# Patient Record
Sex: FEMALE | Race: WHITE | Employment: OTHER | URBAN - METROPOLITAN AREA
[De-identification: names, ages, dates, MRNs, and addresses within clinical notes are randomized per-mention and may not be internally consistent; named-entity substitution may affect disease eponyms.]

---

## 2017-03-08 PROBLEM — E78.00 PURE HYPERCHOLESTEROLEMIA: Status: ACTIVE | Noted: 2017-03-08

## 2017-03-08 PROBLEM — G40.909 SEIZURE DISORDER (HCC): Status: ACTIVE | Noted: 2017-03-08

## 2017-03-08 PROBLEM — Z23 NEED FOR SHINGLES VACCINE: Status: ACTIVE | Noted: 2017-03-08

## 2017-03-08 PROBLEM — M54.50 ACUTE BILATERAL LOW BACK PAIN WITHOUT SCIATICA: Status: ACTIVE | Noted: 2017-03-08

## 2018-04-09 ENCOUNTER — APPOINTMENT (OUTPATIENT)
Dept: CT IMAGING | Age: 73
End: 2018-04-09
Payer: COMMERCIAL

## 2018-04-09 ENCOUNTER — APPOINTMENT (OUTPATIENT)
Dept: GENERAL RADIOLOGY | Age: 73
End: 2018-04-09
Payer: COMMERCIAL

## 2018-04-09 ENCOUNTER — HOSPITAL ENCOUNTER (OUTPATIENT)
Age: 73
Setting detail: OBSERVATION
Discharge: HOME | End: 2018-04-11
Attending: EMERGENCY MEDICINE | Admitting: INTERNAL MEDICINE
Payer: COMMERCIAL

## 2018-04-09 DIAGNOSIS — R07.9 CHEST PAIN, UNSPECIFIED TYPE: Primary | ICD-10-CM

## 2018-04-09 LAB
ALBUMIN SERPL-MCNC: 3.5 G/DL (ref 3.5–5.2)
ALP BLD-CCNC: 108 U/L (ref 35–104)
ALT SERPL-CCNC: 11 U/L (ref 0–32)
ANION GAP SERPL CALCULATED.3IONS-SCNC: 12 MMOL/L (ref 7–16)
AST SERPL-CCNC: 12 U/L (ref 0–31)
BILIRUB SERPL-MCNC: 0.2 MG/DL (ref 0–1.2)
BUN BLDV-MCNC: 24 MG/DL (ref 8–23)
CALCIUM SERPL-MCNC: 8.7 MG/DL (ref 8.6–10.2)
CHLORIDE BLD-SCNC: 101 MMOL/L (ref 98–107)
CO2: 28 MMOL/L (ref 22–29)
CREAT SERPL-MCNC: 0.9 MG/DL (ref 0.5–1)
EKG ATRIAL RATE: 357 BPM
EKG Q-T INTERVAL: 404 MS
EKG QRS DURATION: 92 MS
EKG QTC CALCULATION (BAZETT): 423 MS
EKG R AXIS: -25 DEGREES
EKG T AXIS: -42 DEGREES
EKG VENTRICULAR RATE: 66 BPM
GFR AFRICAN AMERICAN: >60
GFR NON-AFRICAN AMERICAN: >60 ML/MIN/1.73
GLUCOSE BLD-MCNC: 130 MG/DL (ref 74–109)
HCT VFR BLD CALC: 31.8 % (ref 34–48)
HEMOGLOBIN: 9.5 G/DL (ref 11.5–15.5)
LACTIC ACID: 1.3 MMOL/L (ref 0.5–2.2)
LIPASE: 22 U/L (ref 13–60)
MCH RBC QN AUTO: 23 PG (ref 26–35)
MCHC RBC AUTO-ENTMCNC: 29.9 % (ref 32–34.5)
MCV RBC AUTO: 77 FL (ref 80–99.9)
PDW BLD-RTO: 16 FL (ref 11.5–15)
PLATELET # BLD: 220 E9/L (ref 130–450)
PMV BLD AUTO: 11.4 FL (ref 7–12)
POTASSIUM SERPL-SCNC: 4 MMOL/L (ref 3.5–5)
RBC # BLD: 4.13 E12/L (ref 3.5–5.5)
SODIUM BLD-SCNC: 141 MMOL/L (ref 132–146)
TOTAL PROTEIN: 5.9 G/DL (ref 6.4–8.3)
TROPONIN: <0.01 NG/ML (ref 0–0.03)
WBC # BLD: 7.6 E9/L (ref 4.5–11.5)

## 2018-04-09 PROCEDURE — 6370000000 HC RX 637 (ALT 250 FOR IP)

## 2018-04-09 PROCEDURE — 71275 CT ANGIOGRAPHY CHEST: CPT

## 2018-04-09 PROCEDURE — 96374 THER/PROPH/DIAG INJ IV PUSH: CPT

## 2018-04-09 PROCEDURE — 83605 ASSAY OF LACTIC ACID: CPT

## 2018-04-09 PROCEDURE — 71045 X-RAY EXAM CHEST 1 VIEW: CPT

## 2018-04-09 PROCEDURE — 96375 TX/PRO/DX INJ NEW DRUG ADDON: CPT

## 2018-04-09 PROCEDURE — 6360000004 HC RX CONTRAST MEDICATION: Performed by: RADIOLOGY

## 2018-04-09 PROCEDURE — 85027 COMPLETE CBC AUTOMATED: CPT

## 2018-04-09 PROCEDURE — 6360000002 HC RX W HCPCS: Performed by: EMERGENCY MEDICINE

## 2018-04-09 PROCEDURE — 6360000002 HC RX W HCPCS

## 2018-04-09 PROCEDURE — 84484 ASSAY OF TROPONIN QUANT: CPT

## 2018-04-09 PROCEDURE — 93005 ELECTROCARDIOGRAM TRACING: CPT | Performed by: EMERGENCY MEDICINE

## 2018-04-09 PROCEDURE — 36415 COLL VENOUS BLD VENIPUNCTURE: CPT

## 2018-04-09 PROCEDURE — 83690 ASSAY OF LIPASE: CPT

## 2018-04-09 PROCEDURE — 99285 EMERGENCY DEPT VISIT HI MDM: CPT

## 2018-04-09 PROCEDURE — 74174 CTA ABD&PLVS W/CONTRAST: CPT

## 2018-04-09 PROCEDURE — 2580000003 HC RX 258: Performed by: EMERGENCY MEDICINE

## 2018-04-09 PROCEDURE — 80053 COMPREHEN METABOLIC PANEL: CPT

## 2018-04-09 RX ORDER — NITROGLYCERIN 0.4 MG/1
0.4 TABLET SUBLINGUAL EVERY 5 MIN PRN
Status: DISCONTINUED | OUTPATIENT
Start: 2018-04-09 | End: 2018-04-11 | Stop reason: HOSPADM

## 2018-04-09 RX ORDER — FENTANYL CITRATE 50 UG/ML
100 INJECTION, SOLUTION INTRAMUSCULAR; INTRAVENOUS ONCE
Status: COMPLETED | OUTPATIENT
Start: 2018-04-09 | End: 2018-04-09

## 2018-04-09 RX ORDER — DIPHENHYDRAMINE HYDROCHLORIDE 50 MG/ML
25 INJECTION INTRAMUSCULAR; INTRAVENOUS ONCE
Status: COMPLETED | OUTPATIENT
Start: 2018-04-09 | End: 2018-04-09

## 2018-04-09 RX ORDER — 0.9 % SODIUM CHLORIDE 0.9 %
500 INTRAVENOUS SOLUTION INTRAVENOUS ONCE
Status: COMPLETED | OUTPATIENT
Start: 2018-04-09 | End: 2018-04-10

## 2018-04-09 RX ORDER — DIPHENHYDRAMINE HYDROCHLORIDE 50 MG/ML
INJECTION INTRAMUSCULAR; INTRAVENOUS
Status: COMPLETED
Start: 2018-04-09 | End: 2018-04-09

## 2018-04-09 RX ORDER — NITROGLYCERIN 0.4 MG/1
TABLET SUBLINGUAL
Status: COMPLETED
Start: 2018-04-09 | End: 2018-04-09

## 2018-04-09 RX ADMIN — DIPHENHYDRAMINE HYDROCHLORIDE 25 MG: 50 INJECTION INTRAMUSCULAR; INTRAVENOUS at 23:08

## 2018-04-09 RX ADMIN — IOPAMIDOL 90 ML: 755 INJECTION, SOLUTION INTRAVENOUS at 22:52

## 2018-04-09 RX ADMIN — SODIUM CHLORIDE 500 ML: 9 INJECTION, SOLUTION INTRAVENOUS at 21:41

## 2018-04-09 RX ADMIN — NITROGLYCERIN 0.4 MG: 0.4 TABLET SUBLINGUAL at 21:59

## 2018-04-09 RX ADMIN — FENTANYL CITRATE 100 MCG: 50 INJECTION, SOLUTION INTRAMUSCULAR; INTRAVENOUS at 22:52

## 2018-04-09 RX ADMIN — DIPHENHYDRAMINE HYDROCHLORIDE 25 MG: 50 INJECTION, SOLUTION INTRAMUSCULAR; INTRAVENOUS at 23:08

## 2018-04-09 ASSESSMENT — ENCOUNTER SYMPTOMS
ABDOMINAL PAIN: 0
NAUSEA: 1
CHEST TIGHTNESS: 1
VOMITING: 1

## 2018-04-09 ASSESSMENT — PAIN SCALES - GENERAL: PAINLEVEL_OUTOF10: 10

## 2018-04-10 PROBLEM — R07.89 ATYPICAL CHEST PAIN: Status: ACTIVE | Noted: 2018-04-10

## 2018-04-10 PROBLEM — R07.9 CHEST PAIN: Status: ACTIVE | Noted: 2018-04-10

## 2018-04-10 LAB
BASOPHILS ABSOLUTE: 0.02 E9/L (ref 0–0.2)
BASOPHILS RELATIVE PERCENT: 0.2 % (ref 0–2)
EOSINOPHILS ABSOLUTE: 0.02 E9/L (ref 0.05–0.5)
EOSINOPHILS RELATIVE PERCENT: 0.2 % (ref 0–6)
HCT VFR BLD CALC: 30.1 % (ref 34–48)
HEMOGLOBIN: 9 G/DL (ref 11.5–15.5)
IMMATURE GRANULOCYTES #: 0.03 E9/L
IMMATURE GRANULOCYTES %: 0.3 % (ref 0–5)
LV EF: 74 %
LVEF MODALITY: NORMAL
LYMPHOCYTES ABSOLUTE: 1.16 E9/L (ref 1.5–4)
LYMPHOCYTES RELATIVE PERCENT: 13.3 % (ref 20–42)
MCH RBC QN AUTO: 22.7 PG (ref 26–35)
MCHC RBC AUTO-ENTMCNC: 29.9 % (ref 32–34.5)
MCV RBC AUTO: 75.8 FL (ref 80–99.9)
MONOCYTES ABSOLUTE: 0.8 E9/L (ref 0.1–0.95)
MONOCYTES RELATIVE PERCENT: 9.2 % (ref 2–12)
NEUTROPHILS ABSOLUTE: 6.71 E9/L (ref 1.8–7.3)
NEUTROPHILS RELATIVE PERCENT: 76.8 % (ref 43–80)
PDW BLD-RTO: 16 FL (ref 11.5–15)
PLATELET # BLD: 191 E9/L (ref 130–450)
PMV BLD AUTO: 11.2 FL (ref 7–12)
RBC # BLD: 3.97 E12/L (ref 3.5–5.5)
TROPONIN: <0.01 NG/ML (ref 0–0.03)
TROPONIN: <0.01 NG/ML (ref 0–0.03)
WBC # BLD: 8.7 E9/L (ref 4.5–11.5)

## 2018-04-10 PROCEDURE — G0378 HOSPITAL OBSERVATION PER HR: HCPCS

## 2018-04-10 PROCEDURE — 6370000000 HC RX 637 (ALT 250 FOR IP): Performed by: EMERGENCY MEDICINE

## 2018-04-10 PROCEDURE — A9500 TC99M SESTAMIBI: HCPCS | Performed by: RADIOLOGY

## 2018-04-10 PROCEDURE — 78452 HT MUSCLE IMAGE SPECT MULT: CPT

## 2018-04-10 PROCEDURE — 6360000002 HC RX W HCPCS: Performed by: NURSE PRACTITIONER

## 2018-04-10 PROCEDURE — 93018 CV STRESS TEST I&R ONLY: CPT | Performed by: INTERNAL MEDICINE

## 2018-04-10 PROCEDURE — 93016 CV STRESS TEST SUPVJ ONLY: CPT | Performed by: INTERNAL MEDICINE

## 2018-04-10 PROCEDURE — 6370000000 HC RX 637 (ALT 250 FOR IP): Performed by: INTERNAL MEDICINE

## 2018-04-10 PROCEDURE — 36415 COLL VENOUS BLD VENIPUNCTURE: CPT

## 2018-04-10 PROCEDURE — 93017 CV STRESS TEST TRACING ONLY: CPT

## 2018-04-10 PROCEDURE — APPSS45 APP SPLIT SHARED TIME 31-45 MINUTES: Performed by: NURSE PRACTITIONER

## 2018-04-10 PROCEDURE — 2580000003 HC RX 258: Performed by: INTERNAL MEDICINE

## 2018-04-10 PROCEDURE — 3430000000 HC RX DIAGNOSTIC RADIOPHARMACEUTICAL: Performed by: RADIOLOGY

## 2018-04-10 PROCEDURE — 99205 OFFICE O/P NEW HI 60 MIN: CPT | Performed by: INTERNAL MEDICINE

## 2018-04-10 PROCEDURE — 84484 ASSAY OF TROPONIN QUANT: CPT

## 2018-04-10 PROCEDURE — 85025 COMPLETE CBC W/AUTO DIFF WBC: CPT

## 2018-04-10 RX ORDER — ACETAMINOPHEN 325 MG/1
650 TABLET ORAL EVERY 4 HOURS PRN
Status: DISCONTINUED | OUTPATIENT
Start: 2018-04-10 | End: 2018-04-11 | Stop reason: HOSPADM

## 2018-04-10 RX ORDER — PRAVASTATIN SODIUM 20 MG
40 TABLET ORAL DAILY
Status: DISCONTINUED | OUTPATIENT
Start: 2018-04-10 | End: 2018-04-11 | Stop reason: HOSPADM

## 2018-04-10 RX ORDER — ONDANSETRON 2 MG/ML
4 INJECTION INTRAMUSCULAR; INTRAVENOUS EVERY 6 HOURS PRN
Status: DISCONTINUED | OUTPATIENT
Start: 2018-04-10 | End: 2018-04-11 | Stop reason: HOSPADM

## 2018-04-10 RX ORDER — TRAMADOL HYDROCHLORIDE 50 MG/1
50 TABLET ORAL EVERY 4 HOURS PRN
Status: DISCONTINUED | OUTPATIENT
Start: 2018-04-10 | End: 2018-04-11 | Stop reason: HOSPADM

## 2018-04-10 RX ORDER — METOCLOPRAMIDE 5 MG/1
5 TABLET ORAL
Status: DISCONTINUED | OUTPATIENT
Start: 2018-04-10 | End: 2018-04-11 | Stop reason: HOSPADM

## 2018-04-10 RX ORDER — SODIUM CHLORIDE 0.9 % (FLUSH) 0.9 %
10 SYRINGE (ML) INJECTION PRN
Status: DISCONTINUED | OUTPATIENT
Start: 2018-04-10 | End: 2018-04-11 | Stop reason: HOSPADM

## 2018-04-10 RX ORDER — ACETAMINOPHEN AND CODEINE PHOSPHATE 300; 30 MG/1; MG/1
1 TABLET ORAL EVERY 6 HOURS PRN
Status: DISCONTINUED | OUTPATIENT
Start: 2018-04-10 | End: 2018-04-10

## 2018-04-10 RX ORDER — BUPROPION HYDROCHLORIDE 150 MG/1
150 TABLET ORAL EVERY MORNING
Status: DISCONTINUED | OUTPATIENT
Start: 2018-04-10 | End: 2018-04-11 | Stop reason: HOSPADM

## 2018-04-10 RX ORDER — ASPIRIN 81 MG/1
81 TABLET ORAL EVERY OTHER DAY
Status: DISCONTINUED | OUTPATIENT
Start: 2018-04-10 | End: 2018-04-11 | Stop reason: HOSPADM

## 2018-04-10 RX ORDER — LOSARTAN POTASSIUM AND HYDROCHLOROTHIAZIDE 25; 100 MG/1; MG/1
1 TABLET ORAL DAILY
Status: DISCONTINUED | OUTPATIENT
Start: 2018-04-10 | End: 2018-04-10 | Stop reason: SDUPTHER

## 2018-04-10 RX ORDER — SODIUM CHLORIDE 0.9 % (FLUSH) 0.9 %
10 SYRINGE (ML) INJECTION EVERY 12 HOURS SCHEDULED
Status: DISCONTINUED | OUTPATIENT
Start: 2018-04-10 | End: 2018-04-11 | Stop reason: HOSPADM

## 2018-04-10 RX ORDER — LOSARTAN POTASSIUM 50 MG/1
100 TABLET ORAL DAILY
Status: DISCONTINUED | OUTPATIENT
Start: 2018-04-10 | End: 2018-04-11 | Stop reason: HOSPADM

## 2018-04-10 RX ORDER — HYDROCHLOROTHIAZIDE 25 MG/1
25 TABLET ORAL DAILY
Status: DISCONTINUED | OUTPATIENT
Start: 2018-04-10 | End: 2018-04-11 | Stop reason: HOSPADM

## 2018-04-10 RX ORDER — METOPROLOL SUCCINATE 25 MG/1
25 TABLET, EXTENDED RELEASE ORAL 2 TIMES DAILY
Status: DISCONTINUED | OUTPATIENT
Start: 2018-04-10 | End: 2018-04-11

## 2018-04-10 RX ORDER — LEVETIRACETAM 500 MG/1
500 TABLET ORAL 2 TIMES DAILY
Status: DISCONTINUED | OUTPATIENT
Start: 2018-04-10 | End: 2018-04-11 | Stop reason: HOSPADM

## 2018-04-10 RX ORDER — PANTOPRAZOLE SODIUM 40 MG/1
40 TABLET, DELAYED RELEASE ORAL
Status: DISCONTINUED | OUTPATIENT
Start: 2018-04-10 | End: 2018-04-11 | Stop reason: HOSPADM

## 2018-04-10 RX ADMIN — HYDROCHLOROTHIAZIDE 25 MG: 25 TABLET ORAL at 08:20

## 2018-04-10 RX ADMIN — METOCLOPRAMIDE HYDROCHLORIDE 5 MG: 5 TABLET ORAL at 16:59

## 2018-04-10 RX ADMIN — ASPIRIN 81 MG: 81 TABLET, COATED ORAL at 08:20

## 2018-04-10 RX ADMIN — LEVETIRACETAM 500 MG: 500 TABLET, FILM COATED ORAL at 20:21

## 2018-04-10 RX ADMIN — Medication 30 ML: at 00:08

## 2018-04-10 RX ADMIN — TRAMADOL HYDROCHLORIDE 50 MG: 50 TABLET, FILM COATED ORAL at 21:07

## 2018-04-10 RX ADMIN — TRAMADOL HYDROCHLORIDE 50 MG: 50 TABLET, FILM COATED ORAL at 16:59

## 2018-04-10 RX ADMIN — LEVOTHYROXINE SODIUM 169.5 MCG: 88 TABLET ORAL at 08:20

## 2018-04-10 RX ADMIN — ACETAMINOPHEN 650 MG: 325 TABLET, FILM COATED ORAL at 16:09

## 2018-04-10 RX ADMIN — LEVETIRACETAM 500 MG: 500 TABLET, FILM COATED ORAL at 08:19

## 2018-04-10 RX ADMIN — Medication 10 ML: at 20:21

## 2018-04-10 RX ADMIN — TRAMADOL HYDROCHLORIDE 50 MG: 50 TABLET, FILM COATED ORAL at 04:07

## 2018-04-10 RX ADMIN — LOSARTAN POTASSIUM 100 MG: 50 TABLET, FILM COATED ORAL at 08:19

## 2018-04-10 RX ADMIN — PANTOPRAZOLE SODIUM 40 MG: 40 TABLET, DELAYED RELEASE ORAL at 06:38

## 2018-04-10 RX ADMIN — Medication 10 ML: at 08:19

## 2018-04-10 RX ADMIN — PRAVASTATIN SODIUM 40 MG: 20 TABLET ORAL at 08:19

## 2018-04-10 RX ADMIN — Medication 12 MILLICURIE: at 14:01

## 2018-04-10 RX ADMIN — METOPROLOL SUCCINATE 25 MG: 25 TABLET, EXTENDED RELEASE ORAL at 20:21

## 2018-04-10 RX ADMIN — BUPROPION HYDROCHLORIDE 150 MG: 150 TABLET, FILM COATED, EXTENDED RELEASE ORAL at 08:19

## 2018-04-10 RX ADMIN — REGADENOSON 0.4 MG: 0.08 INJECTION, SOLUTION INTRAVENOUS at 15:34

## 2018-04-10 RX ADMIN — METOCLOPRAMIDE HYDROCHLORIDE 5 MG: 5 TABLET ORAL at 06:38

## 2018-04-10 RX ADMIN — Medication 35 MILLICURIE: at 16:33

## 2018-04-10 ASSESSMENT — PAIN DESCRIPTION - PROGRESSION
CLINICAL_PROGRESSION: NOT CHANGED

## 2018-04-10 ASSESSMENT — PAIN DESCRIPTION - LOCATION
LOCATION: BACK;CHEST;ABDOMEN
LOCATION: BACK
LOCATION: BACK;CHEST

## 2018-04-10 ASSESSMENT — PAIN SCALES - GENERAL
PAINLEVEL_OUTOF10: 7
PAINLEVEL_OUTOF10: 0
PAINLEVEL_OUTOF10: 6
PAINLEVEL_OUTOF10: 10
PAINLEVEL_OUTOF10: 6

## 2018-04-10 ASSESSMENT — PAIN DESCRIPTION - FREQUENCY
FREQUENCY: CONTINUOUS

## 2018-04-10 ASSESSMENT — PAIN DESCRIPTION - DESCRIPTORS
DESCRIPTORS: ACHING;DISCOMFORT

## 2018-04-10 ASSESSMENT — PAIN DESCRIPTION - ONSET
ONSET: ON-GOING

## 2018-04-10 ASSESSMENT — PAIN DESCRIPTION - PAIN TYPE
TYPE: ACUTE PAIN

## 2018-04-11 VITALS
BODY MASS INDEX: 39.93 KG/M2 | HEIGHT: 67 IN | DIASTOLIC BLOOD PRESSURE: 66 MMHG | HEART RATE: 62 BPM | OXYGEN SATURATION: 96 % | WEIGHT: 254.4 LBS | TEMPERATURE: 98.8 F | RESPIRATION RATE: 20 BRPM | SYSTOLIC BLOOD PRESSURE: 108 MMHG

## 2018-04-11 PROCEDURE — G0378 HOSPITAL OBSERVATION PER HR: HCPCS

## 2018-04-11 PROCEDURE — 6370000000 HC RX 637 (ALT 250 FOR IP): Performed by: INTERNAL MEDICINE

## 2018-04-11 PROCEDURE — 99214 OFFICE O/P EST MOD 30 MIN: CPT | Performed by: INTERNAL MEDICINE

## 2018-04-11 PROCEDURE — 2580000003 HC RX 258: Performed by: INTERNAL MEDICINE

## 2018-04-11 RX ORDER — METOPROLOL SUCCINATE 25 MG/1
25 TABLET, EXTENDED RELEASE ORAL DAILY
Qty: 30 TABLET | Refills: 3 | Status: SHIPPED | OUTPATIENT
Start: 2018-04-12

## 2018-04-11 RX ORDER — METOPROLOL SUCCINATE 25 MG/1
25 TABLET, EXTENDED RELEASE ORAL DAILY
Status: DISCONTINUED | OUTPATIENT
Start: 2018-04-11 | End: 2018-04-11 | Stop reason: HOSPADM

## 2018-04-11 RX ORDER — METOCLOPRAMIDE 5 MG/1
5 TABLET ORAL
Qty: 120 TABLET | Refills: 3 | Status: SHIPPED | OUTPATIENT
Start: 2018-04-11 | End: 2021-11-17 | Stop reason: ALTCHOICE

## 2018-04-11 RX ADMIN — LEVETIRACETAM 500 MG: 500 TABLET, FILM COATED ORAL at 09:04

## 2018-04-11 RX ADMIN — LEVOTHYROXINE SODIUM 169.5 MCG: 88 TABLET ORAL at 06:29

## 2018-04-11 RX ADMIN — PANTOPRAZOLE SODIUM 40 MG: 40 TABLET, DELAYED RELEASE ORAL at 06:29

## 2018-04-11 RX ADMIN — LOSARTAN POTASSIUM 100 MG: 50 TABLET, FILM COATED ORAL at 09:04

## 2018-04-11 RX ADMIN — HYDROCHLOROTHIAZIDE 25 MG: 25 TABLET ORAL at 09:04

## 2018-04-11 RX ADMIN — BUPROPION HYDROCHLORIDE 150 MG: 150 TABLET, FILM COATED, EXTENDED RELEASE ORAL at 09:04

## 2018-04-11 RX ADMIN — METOCLOPRAMIDE HYDROCHLORIDE 5 MG: 5 TABLET ORAL at 06:29

## 2018-04-11 RX ADMIN — PRAVASTATIN SODIUM 40 MG: 20 TABLET ORAL at 09:03

## 2018-04-11 RX ADMIN — METOPROLOL SUCCINATE 25 MG: 25 TABLET, FILM COATED, EXTENDED RELEASE ORAL at 09:04

## 2018-04-11 RX ADMIN — Medication 10 ML: at 09:05

## 2018-04-11 ASSESSMENT — PAIN SCALES - GENERAL
PAINLEVEL_OUTOF10: 0
PAINLEVEL_OUTOF10: 0

## 2018-06-08 ENCOUNTER — PREP FOR PROCEDURE (OUTPATIENT)
Dept: SURGERY | Age: 73
End: 2018-06-08

## 2018-06-08 DIAGNOSIS — D64.9 CHRONIC ANEMIA: Primary | ICD-10-CM

## 2018-06-08 RX ORDER — SODIUM CHLORIDE 9 MG/ML
INJECTION, SOLUTION INTRAVENOUS CONTINUOUS
Status: CANCELLED | OUTPATIENT
Start: 2018-06-11

## 2018-06-11 ENCOUNTER — HOSPITAL ENCOUNTER (OUTPATIENT)
Age: 73
Setting detail: OUTPATIENT SURGERY
Discharge: HOME | End: 2018-06-11
Attending: SURGERY | Admitting: SURGERY
Payer: COMMERCIAL

## 2018-06-11 VITALS
HEART RATE: 81 BPM | RESPIRATION RATE: 17 BRPM | OXYGEN SATURATION: 96 % | HEIGHT: 66 IN | SYSTOLIC BLOOD PRESSURE: 130 MMHG | WEIGHT: 254 LBS | BODY MASS INDEX: 40.82 KG/M2 | TEMPERATURE: 96.9 F | DIASTOLIC BLOOD PRESSURE: 71 MMHG

## 2018-06-11 PROCEDURE — 99152 MOD SED SAME PHYS/QHP 5/>YRS: CPT | Performed by: SURGERY

## 2018-06-11 PROCEDURE — 3609027000 HC COLONOSCOPY: Performed by: SURGERY

## 2018-06-11 PROCEDURE — 7100000011 HC PHASE II RECOVERY - ADDTL 15 MIN: Performed by: SURGERY

## 2018-06-11 PROCEDURE — 7100000010 HC PHASE II RECOVERY - FIRST 15 MIN: Performed by: SURGERY

## 2018-06-11 PROCEDURE — 6360000002 HC RX W HCPCS: Performed by: SURGERY

## 2018-06-11 PROCEDURE — 99153 MOD SED SAME PHYS/QHP EA: CPT | Performed by: SURGERY

## 2018-06-11 RX ORDER — 0.9 % SODIUM CHLORIDE 0.9 %
10 VIAL (ML) INJECTION EVERY 12 HOURS SCHEDULED
Status: DISCONTINUED | OUTPATIENT
Start: 2018-06-11 | End: 2018-06-11 | Stop reason: HOSPADM

## 2018-06-11 RX ORDER — SODIUM CHLORIDE 9 MG/ML
INJECTION, SOLUTION INTRAVENOUS CONTINUOUS
Status: DISCONTINUED | OUTPATIENT
Start: 2018-06-11 | End: 2018-06-11 | Stop reason: HOSPADM

## 2018-06-11 RX ORDER — MIDAZOLAM HYDROCHLORIDE 1 MG/ML
INJECTION INTRAMUSCULAR; INTRAVENOUS PRN
Status: DISCONTINUED | OUTPATIENT
Start: 2018-06-11 | End: 2018-06-11 | Stop reason: HOSPADM

## 2018-06-11 RX ORDER — MEPERIDINE HYDROCHLORIDE 50 MG/ML
INJECTION INTRAMUSCULAR; INTRAVENOUS; SUBCUTANEOUS PRN
Status: DISCONTINUED | OUTPATIENT
Start: 2018-06-11 | End: 2018-06-11 | Stop reason: HOSPADM

## 2018-06-11 RX ORDER — 0.9 % SODIUM CHLORIDE 0.9 %
10 VIAL (ML) INJECTION PRN
Status: DISCONTINUED | OUTPATIENT
Start: 2018-06-11 | End: 2018-06-11 | Stop reason: HOSPADM

## 2018-06-11 ASSESSMENT — PAIN SCALES - GENERAL
PAINLEVEL_OUTOF10: 0

## 2018-06-11 ASSESSMENT — PAIN - FUNCTIONAL ASSESSMENT: PAIN_FUNCTIONAL_ASSESSMENT: 0-10

## 2018-08-10 ENCOUNTER — HOSPITAL ENCOUNTER (OUTPATIENT)
Dept: MRI IMAGING | Age: 73
Discharge: HOME | End: 2018-08-12
Payer: COMMERCIAL

## 2018-08-10 DIAGNOSIS — D32.9 MENINGIOMA (HCC): ICD-10-CM

## 2018-08-10 LAB
BUN BLDV-MCNC: 17 MG/DL (ref 8–23)
CREAT SERPL-MCNC: 1 MG/DL (ref 0.5–1)
GFR AFRICAN AMERICAN: >60
GFR NON-AFRICAN AMERICAN: 54 ML/MIN/1.73

## 2018-08-10 PROCEDURE — 6360000004 HC RX CONTRAST MEDICATION: Performed by: RADIOLOGY

## 2018-08-10 PROCEDURE — 70553 MRI BRAIN STEM W/O & W/DYE: CPT

## 2018-08-10 PROCEDURE — 36415 COLL VENOUS BLD VENIPUNCTURE: CPT

## 2018-08-10 PROCEDURE — 82565 ASSAY OF CREATININE: CPT

## 2018-08-10 PROCEDURE — 84520 ASSAY OF UREA NITROGEN: CPT

## 2018-08-10 PROCEDURE — A9577 INJ MULTIHANCE: HCPCS | Performed by: RADIOLOGY

## 2018-08-10 RX ADMIN — GADOBENATE DIMEGLUMINE 20 ML: 529 INJECTION, SOLUTION INTRAVENOUS at 11:21

## 2018-08-15 ENCOUNTER — TELEPHONE (OUTPATIENT)
Dept: CARDIOLOGY CLINIC | Age: 73
End: 2018-08-15

## 2018-08-18 PROBLEM — I48.19 PERSISTENT ATRIAL FIBRILLATION (HCC): Status: ACTIVE | Noted: 2018-08-18

## 2018-08-28 ENCOUNTER — OFFICE VISIT (OUTPATIENT)
Dept: CARDIOLOGY CLINIC | Age: 73
End: 2018-08-28
Payer: COMMERCIAL

## 2018-08-28 VITALS
RESPIRATION RATE: 18 BRPM | BODY MASS INDEX: 38.96 KG/M2 | HEIGHT: 66 IN | DIASTOLIC BLOOD PRESSURE: 86 MMHG | HEART RATE: 79 BPM | SYSTOLIC BLOOD PRESSURE: 132 MMHG | WEIGHT: 242.4 LBS

## 2018-08-28 DIAGNOSIS — R07.9 CHEST PAIN, UNSPECIFIED TYPE: ICD-10-CM

## 2018-08-28 DIAGNOSIS — I48.19 PERSISTENT ATRIAL FIBRILLATION (HCC): ICD-10-CM

## 2018-08-28 PROCEDURE — 99215 OFFICE O/P EST HI 40 MIN: CPT | Performed by: INTERNAL MEDICINE

## 2018-08-28 PROCEDURE — 93000 ELECTROCARDIOGRAM COMPLETE: CPT | Performed by: INTERNAL MEDICINE

## 2018-08-28 RX ORDER — FLUOXETINE HYDROCHLORIDE 20 MG/1
40 CAPSULE ORAL EVERY MORNING
COMMUNITY

## 2018-08-28 RX ORDER — OMEPRAZOLE 20 MG/1
20 CAPSULE, DELAYED RELEASE ORAL DAILY PRN
COMMUNITY

## 2018-08-28 NOTE — PROGRESS NOTES
Chief Complaint   Patient presents with    Atrial Fibrillation       Patient Active Problem List    Diagnosis Date Noted    Hypertension      Priority: High    Hyperlipidemia      Priority: Medium    Persistent atrial fibrillation ([facility] 08/18/2018    Chest pain 04/10/2018     Overview Note:     A. Pharm stress ([facility]): 4/2018:  \"small area of reversibility in the distal anteroseptal apical region\"  -  PERSONAL REVIEW - NORMAL  EF 74%      Seizure disorder ([facility] 03/08/2017    Essential hypertension 10/21/2016    JUAN (generalized anxiety disorder) 10/21/2016    Meningioma (HCC)        Current Outpatient Prescriptions   Medication Sig Dispense Refill    FLUoxetine (PROZAC) 20 MG capsule Take 20 mg by mouth daily      apixaban (ELIQUIS) 5 MG TABS tablet Take 5 mg by mouth 2 times daily      omeprazole (PRILOSEC) 20 MG delayed release capsule Take 20 mg by mouth daily      metoprolol succinate (TOPROL XL) 25 MG extended release tablet Take 1 tablet by mouth daily 30 tablet 3    metoclopramide (REGLAN) 5 MG tablet Take 1 tablet by mouth 3 times daily (before meals) (Patient taking differently: Take 5 mg by mouth daily ) 120 tablet 3    levETIRAcetam (KEPPRA) 500 MG tablet Take 1 tablet by mouth 2 times daily (Patient taking differently: Take 500 mg by mouth daily ) 90 tablet 2    levothyroxine (SYNTHROID) 175 MCG tablet Take 1 tablet by mouth daily .,. (Patient taking differently: Take 150 mcg by mouth daily . ,.) 90 tablet 3    losartan-hydrochlorothiazide (HYZAAR) 100-25 MG per tablet Take 1 tablet by mouth daily 90 tablet 3    pravastatin (PRAVACHOL) 40 MG tablet Take 1 tablet by mouth daily 90 tablet 3    buPROPion (WELLBUTRIN XL) 150 MG extended release tablet Take 1 tablet by mouth every morning 90 tablet 3    aspirin 81 MG EC tablet Take 81 mg by mouth every other day.  esomeprazole (NEXIUM) 40 MG capsule Take 40 mg by mouth as needed.          No current facility-administered medications for this visit. Allergies   Allergen Reactions    Pcn [Penicillins] Other (See Comments)     Unknown      Sulfa Antibiotics Swelling     Whole body and itching       Vitals:    08/28/18 1324   BP: 132/86   Pulse: 79   Resp: 18   Weight: 242 lb 6.4 oz (110 kg)   Height: 5' 6\" (1.676 m)       Social History     Social History    Marital status:      Spouse name: N/A    Number of children: N/A    Years of education: N/A     Occupational History    Not on file. Social History Main Topics    Smoking status: Former Smoker     Types: Cigarettes     Quit date: 8/28/1988    Smokeless tobacco: Never Used    Alcohol use Yes      Comment: rare    Drug use: No    Sexual activity: Not Currently     Other Topics Concern    Not on file     Social History Narrative    No narrative on file       Family History   Problem Relation Age of Onset    Heart Disease Father     Arrhythmia Mother         a fib    Diabetes Mother     Cancer Sister         BREAST         SUBJECTIVE: Sandy Gleason presents to the office today for re-evaluation of cardiac diagnoses . Was seen by dr. Nerissa Mercado in hospital in April, dr. Anil Rivera apparently pointedly sent her to me today . She complains of mild sob and denies   chest pain, orthopnea, paroxysmal nocturnal dyspnea and syncope. Symptoms ar emild but definite, confirmed by delma in law a nurse. Hs anemia and getting iron infusiions. Review of Systems:   Heart: as above   Lungs: as above   Eyes: denies changes in vision or discharge. Ears: denies changes in hearing or pain. Nose: denies epistaxis or masses   Throat: denies sore throat or trouble swallowing. Neuro: denies numbness, tingling, tremors. Skin: denies rashes or itching. : denies hematuria, dysuria   GI: denies vomiting, diarrhea   Psych: denies mood changed, anxiety, depression. all others negative.     Physical Exam   /86   Pulse 79   Resp 18   Ht 5' 6\" (1.676 m)   Wt 242

## 2018-09-06 ENCOUNTER — ANESTHESIA EVENT (OUTPATIENT)
Dept: CARDIAC CATH/INVASIVE PROCEDURES | Age: 73
End: 2018-09-06

## 2018-09-06 ENCOUNTER — HOSPITAL ENCOUNTER (OUTPATIENT)
Dept: CARDIAC CATH/INVASIVE PROCEDURES | Age: 73
Discharge: HOME | End: 2018-09-06
Payer: COMMERCIAL

## 2018-09-06 ENCOUNTER — ANESTHESIA (OUTPATIENT)
Dept: CARDIAC CATH/INVASIVE PROCEDURES | Age: 73
End: 2018-09-06

## 2018-09-06 VITALS
RESPIRATION RATE: 17 BRPM | SYSTOLIC BLOOD PRESSURE: 132 MMHG | DIASTOLIC BLOOD PRESSURE: 91 MMHG | OXYGEN SATURATION: 97 %

## 2018-09-06 VITALS
BODY MASS INDEX: 37.51 KG/M2 | DIASTOLIC BLOOD PRESSURE: 86 MMHG | HEIGHT: 67 IN | TEMPERATURE: 97.5 F | SYSTOLIC BLOOD PRESSURE: 144 MMHG | OXYGEN SATURATION: 98 % | WEIGHT: 239 LBS | RESPIRATION RATE: 13 BRPM | HEART RATE: 64 BPM

## 2018-09-06 PROCEDURE — 93325 DOPPLER ECHO COLOR FLOW MAPG: CPT

## 2018-09-06 PROCEDURE — 6360000002 HC RX W HCPCS

## 2018-09-06 PROCEDURE — 3700000001 HC ADD 15 MINUTES (ANESTHESIA)

## 2018-09-06 PROCEDURE — 2580000003 HC RX 258

## 2018-09-06 PROCEDURE — 93312 ECHO TRANSESOPHAGEAL: CPT

## 2018-09-06 PROCEDURE — 93321 DOPPLER ECHO F-UP/LMTD STD: CPT

## 2018-09-06 PROCEDURE — 92960 CARDIOVERSION ELECTRIC EXT: CPT | Performed by: INTERNAL MEDICINE

## 2018-09-06 PROCEDURE — 3700000000 HC ANESTHESIA ATTENDED CARE

## 2018-09-06 PROCEDURE — 2709999900 HC NON-CHARGEABLE SUPPLY

## 2018-09-06 RX ORDER — SODIUM CHLORIDE 9 MG/ML
INJECTION, SOLUTION INTRAVENOUS CONTINUOUS PRN
Status: DISCONTINUED | OUTPATIENT
Start: 2018-09-06 | End: 2018-09-06 | Stop reason: SDUPTHER

## 2018-09-06 RX ORDER — PROPOFOL 10 MG/ML
INJECTION, EMULSION INTRAVENOUS PRN
Status: DISCONTINUED | OUTPATIENT
Start: 2018-09-06 | End: 2018-09-06 | Stop reason: SDUPTHER

## 2018-09-06 RX ADMIN — PROPOFOL 130 MG: 10 INJECTION, EMULSION INTRAVENOUS at 10:43

## 2018-09-06 RX ADMIN — SODIUM CHLORIDE: 9 INJECTION, SOLUTION INTRAVENOUS at 10:38

## 2018-09-06 ASSESSMENT — ENCOUNTER SYMPTOMS: DYSPNEA ACTIVITY LEVEL: AFTER AMBULATING 2 FLIGHTS OF STAIRS

## 2018-09-06 NOTE — ANESTHESIA PRE PROCEDURE
Hypothyroidism       Past Surgical History:        Procedure Laterality Date    BRAIN MENINGIOMA EXCISION  2003, 2010    COLONOSCOPY  12/16/2015    CRANIOTOMY Left 08/11/2016    FRONTAL MENINGIOMA x 3    EYE SURGERY Bilateral     Cataracts    HYSTERECTOMY      NE COLONOSCOPY FLX DX W/COLLJ SPEC WHEN PFRMD N/A 6/11/2018    COLONOSCOPY DIAGNOSTIC performed by Jennifer Almonte MD at [facility] ENDOSCOPY         Social History:    Social History   Substance Use Topics    Smoking status: Former Smoker     Types: Cigarettes     Quit date: 8/28/1988    Smokeless tobacco: Never Used    Alcohol use Yes      Comment: rare                                Counseling given: Not Answered      Vital Signs (Current):   Vitals:    09/06/18 0950   BP: 134/79   Pulse: 78   Resp: 20   Temp: 36.4 °C (97.5 °F)   Weight: 239 lb (108.4 kg)   Height: 5' 6.5\" (1.689 m)                                              BP Readings from Last 3 Encounters:   09/06/18 134/79   08/28/18 132/86   06/11/18 130/71       NPO Status: Time of last liquid consumption: 2300                        Time of last solid consumption: 2300                        Date of last liquid consumption: 09/05/18                        Date of last solid food consumption: 09/05/18    BMI:   Wt Readings from Last 3 Encounters:   09/06/18 239 lb (108.4 kg)   08/28/18 242 lb 6.4 oz (110 kg)   06/11/18 254 lb (115.2 kg)     Body mass index is 38 kg/m².     CBC:   Lab Results   Component Value Date    WBC 8.7 04/10/2018    RBC 3.97 04/10/2018    HGB 9.0 04/10/2018    HCT 30.1 04/10/2018    MCV 75.8 04/10/2018    RDW 16.0 04/10/2018     04/10/2018       CMP:   Lab Results   Component Value Date     04/09/2018    K 4.0 04/09/2018     04/09/2018    CO2 28 04/09/2018    BUN 17 08/10/2018    CREATININE 1.0 08/10/2018    GFRAA >60 08/10/2018    LABGLOM 54 08/10/2018    GLUCOSE 130 04/09/2018 Beta Blocker:  Dose within 24 Hrs         Neuro/Psych:   (+) seizures (currently on keppra): well controlled, headaches:, psychiatric history:             ROS comment: Hx: meningioma x2--frontal lobe, L crani 8/11/16, had crani x3 in past per pt GI/Hepatic/Renal:   (+) hiatal hernia, GERD: well controlled,           Endo/Other:    (+) hypothyroidism, blood dyscrasia: anticoagulation therapy and anemia, arthritis:., malignancy/cancer (meningioma x2--frontal lobe,  crani 8/11/16). Pt had no PAT visit       Abdominal:   (+) obese,         Vascular: negative vascular ROS. Anesthesia Plan      MAC     ASA 3       Induction: intravenous. Anesthetic plan and risks discussed with patient. Plan discussed with CRNA and attending. Rere Orellana RN , [facility]  9/6/2018        Pt seen, examined, chart reviewed, plan discussed.   Lola Serrano  9/6/2018  10:44 AM

## 2018-09-06 NOTE — ANESTHESIA POSTPROCEDURE EVALUATION
Department of Anesthesiology  Postprocedure Note    Patient: Marquis Chandler  MRN: [id number]  YOB: 1945  Date of evaluation: 9/6/2018  Time:  2:36 PM     Procedure Summary     Date:  09/06/18 Room / Location:  [facility] CATH LAB; [facility] ECHO    Anesthesia Start:  1038 Anesthesia Stop:      Procedure:  SEY GEOVANNA CARDIOVERSION W/ ANES Diagnosis:      Scheduled Providers:   Responsible Provider:  Asha Khan MD    Anesthesia Type:  MAC ASA Status:  3          Anesthesia Type: MAC    Pamela Phase I:      Pamela Phase II:      Last vitals: Reviewed and per EMR flowsheets.        Anesthesia Post Evaluation    Patient location during evaluation: PACU  Patient participation: complete - patient participated  Level of consciousness: awake  Pain score: 3  Airway patency: patent  Nausea & Vomiting: no nausea and no vomiting  Complications: no  Cardiovascular status: blood pressure returned to baseline  Respiratory status: acceptable  Hydration status: euvolemic

## 2018-10-09 ENCOUNTER — OFFICE VISIT (OUTPATIENT)
Dept: CARDIOLOGY CLINIC | Age: 73
End: 2018-10-09
Payer: COMMERCIAL

## 2018-10-09 VITALS
DIASTOLIC BLOOD PRESSURE: 80 MMHG | HEIGHT: 66 IN | SYSTOLIC BLOOD PRESSURE: 134 MMHG | BODY MASS INDEX: 38.41 KG/M2 | WEIGHT: 239 LBS | RESPIRATION RATE: 14 BRPM | HEART RATE: 53 BPM

## 2018-10-09 DIAGNOSIS — I48.19 PERSISTENT ATRIAL FIBRILLATION (HCC): ICD-10-CM

## 2018-10-09 PROCEDURE — 99214 OFFICE O/P EST MOD 30 MIN: CPT | Performed by: INTERNAL MEDICINE

## 2018-10-09 PROCEDURE — 93000 ELECTROCARDIOGRAM COMPLETE: CPT | Performed by: INTERNAL MEDICINE

## 2018-10-09 RX ORDER — GABAPENTIN 300 MG/1
300 CAPSULE ORAL 2 TIMES DAILY
COMMUNITY
End: 2018-11-30

## 2018-10-10 ENCOUNTER — PREP FOR PROCEDURE (OUTPATIENT)
Dept: SURGERY | Age: 73
End: 2018-10-10

## 2018-10-10 DIAGNOSIS — K81.0 ACUTE CHOLECYSTITIS: Primary | ICD-10-CM

## 2018-10-10 RX ORDER — SODIUM CHLORIDE 0.9 % (FLUSH) 0.9 %
10 SYRINGE (ML) INJECTION PRN
Status: CANCELLED | OUTPATIENT
Start: 2018-10-10 | End: 2019-10-10

## 2018-10-10 RX ORDER — SODIUM CHLORIDE 9 MG/ML
INJECTION, SOLUTION INTRAVENOUS CONTINUOUS
Status: CANCELLED | OUTPATIENT
Start: 2018-10-11 | End: 2019-10-11

## 2018-10-10 RX ORDER — ACETAMINOPHEN 325 MG/1
650 TABLET ORAL EVERY 6 HOURS PRN
COMMUNITY

## 2018-10-10 RX ORDER — SODIUM CHLORIDE 0.9 % (FLUSH) 0.9 %
10 SYRINGE (ML) INJECTION EVERY 12 HOURS SCHEDULED
Status: CANCELLED | OUTPATIENT
Start: 2018-10-10 | End: 2019-10-10

## 2018-10-10 RX ORDER — CIPROFLOXACIN 2 MG/ML
400 INJECTION, SOLUTION INTRAVENOUS
Status: CANCELLED | OUTPATIENT
Start: 2018-10-11 | End: 2019-10-10

## 2018-10-11 ENCOUNTER — APPOINTMENT (OUTPATIENT)
Dept: GENERAL RADIOLOGY | Age: 73
End: 2018-10-11
Attending: SURGERY
Payer: COMMERCIAL

## 2018-10-11 ENCOUNTER — HOSPITAL ENCOUNTER (OUTPATIENT)
Age: 73
Setting detail: OUTPATIENT SURGERY
Discharge: HOME | End: 2018-10-11
Attending: SURGERY | Admitting: SURGERY
Payer: COMMERCIAL

## 2018-10-11 ENCOUNTER — ANESTHESIA EVENT (OUTPATIENT)
Dept: OPERATING ROOM | Age: 73
End: 2018-10-11
Payer: COMMERCIAL

## 2018-10-11 ENCOUNTER — ANESTHESIA (OUTPATIENT)
Dept: OPERATING ROOM | Age: 73
End: 2018-10-11
Payer: COMMERCIAL

## 2018-10-11 VITALS
HEIGHT: 66 IN | SYSTOLIC BLOOD PRESSURE: 127 MMHG | RESPIRATION RATE: 16 BRPM | DIASTOLIC BLOOD PRESSURE: 62 MMHG | TEMPERATURE: 97.8 F | BODY MASS INDEX: 38.41 KG/M2 | WEIGHT: 239 LBS | OXYGEN SATURATION: 94 % | HEART RATE: 88 BPM

## 2018-10-11 VITALS — SYSTOLIC BLOOD PRESSURE: 184 MMHG | OXYGEN SATURATION: 100 % | TEMPERATURE: 95.9 F | DIASTOLIC BLOOD PRESSURE: 106 MMHG

## 2018-10-11 DIAGNOSIS — R10.10 PAIN OF UPPER ABDOMEN: ICD-10-CM

## 2018-10-11 LAB
ALBUMIN SERPL-MCNC: 3.4 G/DL (ref 3.5–5.2)
ALP BLD-CCNC: 756 U/L (ref 35–104)
ALT SERPL-CCNC: 69 U/L (ref 0–32)
ANION GAP SERPL CALCULATED.3IONS-SCNC: 10 MMOL/L (ref 7–16)
AST SERPL-CCNC: 43 U/L (ref 0–31)
BASOPHILS ABSOLUTE: 0.03 E9/L (ref 0–0.2)
BASOPHILS RELATIVE PERCENT: 0.5 % (ref 0–2)
BILIRUB SERPL-MCNC: 3.4 MG/DL (ref 0–1.2)
BUN BLDV-MCNC: 14 MG/DL (ref 8–23)
CALCIUM SERPL-MCNC: 9.4 MG/DL (ref 8.6–10.2)
CHLORIDE BLD-SCNC: 101 MMOL/L (ref 98–107)
CO2: 31 MMOL/L (ref 22–29)
CREAT SERPL-MCNC: 1 MG/DL (ref 0.5–1)
EOSINOPHILS ABSOLUTE: 0.15 E9/L (ref 0.05–0.5)
EOSINOPHILS RELATIVE PERCENT: 2.5 % (ref 0–6)
GFR AFRICAN AMERICAN: >60
GFR NON-AFRICAN AMERICAN: 54 ML/MIN/1.73
GLUCOSE BLD-MCNC: 105 MG/DL (ref 74–109)
HCT VFR BLD CALC: 39.1 % (ref 34–48)
HEMOGLOBIN: 12.4 G/DL (ref 11.5–15.5)
IMMATURE GRANULOCYTES #: 0.02 E9/L
IMMATURE GRANULOCYTES %: 0.3 % (ref 0–5)
LYMPHOCYTES ABSOLUTE: 1.17 E9/L (ref 1.5–4)
LYMPHOCYTES RELATIVE PERCENT: 19.5 % (ref 20–42)
MCH RBC QN AUTO: 25.7 PG (ref 26–35)
MCHC RBC AUTO-ENTMCNC: 31.7 % (ref 32–34.5)
MCV RBC AUTO: 81.1 FL (ref 80–99.9)
MONOCYTES ABSOLUTE: 0.59 E9/L (ref 0.1–0.95)
MONOCYTES RELATIVE PERCENT: 9.8 % (ref 2–12)
NEUTROPHILS ABSOLUTE: 4.03 E9/L (ref 1.8–7.3)
NEUTROPHILS RELATIVE PERCENT: 67.4 % (ref 43–80)
PDW BLD-RTO: 18.1 FL (ref 11.5–15)
PLATELET # BLD: 187 E9/L (ref 130–450)
PMV BLD AUTO: 11.1 FL (ref 7–12)
POTASSIUM REFLEX MAGNESIUM: 3.9 MMOL/L (ref 3.5–5)
RBC # BLD: 4.82 E12/L (ref 3.5–5.5)
SODIUM BLD-SCNC: 142 MMOL/L (ref 132–146)
TOTAL PROTEIN: 6.6 G/DL (ref 6.4–8.3)
WBC # BLD: 6 E9/L (ref 4.5–11.5)

## 2018-10-11 PROCEDURE — 36415 COLL VENOUS BLD VENIPUNCTURE: CPT

## 2018-10-11 PROCEDURE — 2709999900 HC NON-CHARGEABLE SUPPLY: Performed by: SURGERY

## 2018-10-11 PROCEDURE — 6360000004 HC RX CONTRAST MEDICATION: Performed by: SURGERY

## 2018-10-11 PROCEDURE — 88304 TISSUE EXAM BY PATHOLOGIST: CPT

## 2018-10-11 PROCEDURE — 3209999900 FLUORO FOR SURGICAL PROCEDURES

## 2018-10-11 PROCEDURE — 3600000004 HC SURGERY LEVEL 4 BASE: Performed by: SURGERY

## 2018-10-11 PROCEDURE — 2580000003 HC RX 258

## 2018-10-11 PROCEDURE — 2500000003 HC RX 250 WO HCPCS

## 2018-10-11 PROCEDURE — 6360000002 HC RX W HCPCS

## 2018-10-11 PROCEDURE — 2500000003 HC RX 250 WO HCPCS: Performed by: ANESTHESIOLOGY

## 2018-10-11 PROCEDURE — 3700000000 HC ANESTHESIA ATTENDED CARE: Performed by: SURGERY

## 2018-10-11 PROCEDURE — 2580000003 HC RX 258: Performed by: SURGERY

## 2018-10-11 PROCEDURE — 85025 COMPLETE CBC W/AUTO DIFF WBC: CPT

## 2018-10-11 PROCEDURE — 6360000002 HC RX W HCPCS: Performed by: SURGERY

## 2018-10-11 PROCEDURE — 7100000000 HC PACU RECOVERY - FIRST 15 MIN: Performed by: SURGERY

## 2018-10-11 PROCEDURE — 7100000010 HC PHASE II RECOVERY - FIRST 15 MIN: Performed by: SURGERY

## 2018-10-11 PROCEDURE — 6370000000 HC RX 637 (ALT 250 FOR IP): Performed by: SURGERY

## 2018-10-11 PROCEDURE — 6360000002 HC RX W HCPCS: Performed by: ANESTHESIOLOGY

## 2018-10-11 PROCEDURE — 7100000001 HC PACU RECOVERY - ADDTL 15 MIN: Performed by: SURGERY

## 2018-10-11 PROCEDURE — 3600000014 HC SURGERY LEVEL 4 ADDTL 15MIN: Performed by: SURGERY

## 2018-10-11 PROCEDURE — 80053 COMPREHEN METABOLIC PANEL: CPT

## 2018-10-11 PROCEDURE — 7100000011 HC PHASE II RECOVERY - ADDTL 15 MIN: Performed by: SURGERY

## 2018-10-11 PROCEDURE — 3700000001 HC ADD 15 MINUTES (ANESTHESIA): Performed by: SURGERY

## 2018-10-11 RX ORDER — CIPROFLOXACIN 2 MG/ML
INJECTION, SOLUTION INTRAVENOUS PRN
Status: DISCONTINUED | OUTPATIENT
Start: 2018-10-11 | End: 2018-10-11 | Stop reason: SDUPTHER

## 2018-10-11 RX ORDER — FENTANYL CITRATE 50 UG/ML
INJECTION, SOLUTION INTRAMUSCULAR; INTRAVENOUS PRN
Status: DISCONTINUED | OUTPATIENT
Start: 2018-10-11 | End: 2018-10-11 | Stop reason: SDUPTHER

## 2018-10-11 RX ORDER — LIDOCAINE HYDROCHLORIDE 20 MG/ML
INJECTION, SOLUTION INFILTRATION; PERINEURAL PRN
Status: DISCONTINUED | OUTPATIENT
Start: 2018-10-11 | End: 2018-10-11 | Stop reason: SDUPTHER

## 2018-10-11 RX ORDER — MIDAZOLAM HYDROCHLORIDE 1 MG/ML
INJECTION INTRAMUSCULAR; INTRAVENOUS PRN
Status: DISCONTINUED | OUTPATIENT
Start: 2018-10-11 | End: 2018-10-11 | Stop reason: SDUPTHER

## 2018-10-11 RX ORDER — EPHEDRINE SULFATE 50 MG/ML
INJECTION INTRAVENOUS PRN
Status: DISCONTINUED | OUTPATIENT
Start: 2018-10-11 | End: 2018-10-11 | Stop reason: SDUPTHER

## 2018-10-11 RX ORDER — ROCURONIUM BROMIDE 10 MG/ML
INJECTION, SOLUTION INTRAVENOUS PRN
Status: DISCONTINUED | OUTPATIENT
Start: 2018-10-11 | End: 2018-10-11 | Stop reason: SDUPTHER

## 2018-10-11 RX ORDER — SODIUM CHLORIDE 0.9 % (FLUSH) 0.9 %
10 SYRINGE (ML) INJECTION EVERY 12 HOURS SCHEDULED
Status: DISCONTINUED | OUTPATIENT
Start: 2018-10-11 | End: 2018-10-11 | Stop reason: HOSPADM

## 2018-10-11 RX ORDER — SODIUM CHLORIDE 9 MG/ML
INJECTION, SOLUTION INTRAVENOUS CONTINUOUS PRN
Status: DISCONTINUED | OUTPATIENT
Start: 2018-10-11 | End: 2018-10-11 | Stop reason: SDUPTHER

## 2018-10-11 RX ORDER — HYDRALAZINE HYDROCHLORIDE 20 MG/ML
INJECTION INTRAMUSCULAR; INTRAVENOUS
Status: DISCONTINUED
Start: 2018-10-11 | End: 2018-10-11 | Stop reason: HOSPADM

## 2018-10-11 RX ORDER — SODIUM CHLORIDE 0.9 % (FLUSH) 0.9 %
10 SYRINGE (ML) INJECTION PRN
Status: DISCONTINUED | OUTPATIENT
Start: 2018-10-11 | End: 2018-10-11 | Stop reason: HOSPADM

## 2018-10-11 RX ORDER — DEXAMETHASONE SODIUM PHOSPHATE 4 MG/ML
INJECTION, SOLUTION INTRA-ARTICULAR; INTRALESIONAL; INTRAMUSCULAR; INTRAVENOUS; SOFT TISSUE PRN
Status: DISCONTINUED | OUTPATIENT
Start: 2018-10-11 | End: 2018-10-11 | Stop reason: SDUPTHER

## 2018-10-11 RX ORDER — HYDROCODONE BITARTRATE AND ACETAMINOPHEN 5; 325 MG/1; MG/1
1 TABLET ORAL
Status: COMPLETED | OUTPATIENT
Start: 2018-10-11 | End: 2018-10-11

## 2018-10-11 RX ORDER — LABETALOL HYDROCHLORIDE 5 MG/ML
5 INJECTION, SOLUTION INTRAVENOUS EVERY 10 MIN PRN
Status: DISCONTINUED | OUTPATIENT
Start: 2018-10-11 | End: 2018-10-11 | Stop reason: HOSPADM

## 2018-10-11 RX ORDER — SODIUM CHLORIDE 9 MG/ML
INJECTION, SOLUTION INTRAVENOUS CONTINUOUS
Status: DISCONTINUED | OUTPATIENT
Start: 2018-10-11 | End: 2018-10-11 | Stop reason: HOSPADM

## 2018-10-11 RX ORDER — PROPOFOL 10 MG/ML
INJECTION, EMULSION INTRAVENOUS PRN
Status: DISCONTINUED | OUTPATIENT
Start: 2018-10-11 | End: 2018-10-11 | Stop reason: SDUPTHER

## 2018-10-11 RX ORDER — PROMETHAZINE HYDROCHLORIDE 25 MG/ML
25 INJECTION, SOLUTION INTRAMUSCULAR; INTRAVENOUS PRN
Status: DISCONTINUED | OUTPATIENT
Start: 2018-10-11 | End: 2018-10-11 | Stop reason: HOSPADM

## 2018-10-11 RX ORDER — MEPERIDINE HYDROCHLORIDE 50 MG/ML
12.5 INJECTION INTRAMUSCULAR; INTRAVENOUS; SUBCUTANEOUS EVERY 5 MIN PRN
Status: DISCONTINUED | OUTPATIENT
Start: 2018-10-11 | End: 2018-10-11 | Stop reason: HOSPADM

## 2018-10-11 RX ORDER — CIPROFLOXACIN 2 MG/ML
400 INJECTION, SOLUTION INTRAVENOUS
Status: DISCONTINUED | OUTPATIENT
Start: 2018-10-11 | End: 2018-10-11 | Stop reason: HOSPADM

## 2018-10-11 RX ORDER — GLYCOPYRROLATE 1 MG/5 ML
SYRINGE (ML) INTRAVENOUS PRN
Status: DISCONTINUED | OUTPATIENT
Start: 2018-10-11 | End: 2018-10-11 | Stop reason: SDUPTHER

## 2018-10-11 RX ORDER — HYDRALAZINE HYDROCHLORIDE 20 MG/ML
5 INJECTION INTRAMUSCULAR; INTRAVENOUS EVERY 10 MIN PRN
Status: DISCONTINUED | OUTPATIENT
Start: 2018-10-11 | End: 2018-10-11 | Stop reason: HOSPADM

## 2018-10-11 RX ORDER — ONDANSETRON 2 MG/ML
INJECTION INTRAMUSCULAR; INTRAVENOUS PRN
Status: DISCONTINUED | OUTPATIENT
Start: 2018-10-11 | End: 2018-10-11 | Stop reason: SDUPTHER

## 2018-10-11 RX ADMIN — CIPROFLOXACIN 400 MG: 2 INJECTION, SOLUTION INTRAVENOUS at 09:37

## 2018-10-11 RX ADMIN — EPHEDRINE SULFATE 10 MG: 50 INJECTION, SOLUTION INTRAVENOUS at 10:24

## 2018-10-11 RX ADMIN — SODIUM CHLORIDE: 9 INJECTION, SOLUTION INTRAVENOUS at 08:13

## 2018-10-11 RX ADMIN — LABETALOL HYDROCHLORIDE 5 MG: 5 INJECTION INTRAVENOUS at 12:45

## 2018-10-11 RX ADMIN — LABETALOL HYDROCHLORIDE 5 MG: 5 INJECTION INTRAVENOUS at 13:25

## 2018-10-11 RX ADMIN — FENTANYL CITRATE 50 MCG: 50 INJECTION, SOLUTION INTRAMUSCULAR; INTRAVENOUS at 10:44

## 2018-10-11 RX ADMIN — PROPOFOL 130 MG: 10 INJECTION, EMULSION INTRAVENOUS at 09:38

## 2018-10-11 RX ADMIN — MIDAZOLAM HYDROCHLORIDE 2 MG: 1 INJECTION, SOLUTION INTRAMUSCULAR; INTRAVENOUS at 09:36

## 2018-10-11 RX ADMIN — LIDOCAINE HYDROCHLORIDE 100 MG: 20 INJECTION, SOLUTION INFILTRATION; PERINEURAL at 09:38

## 2018-10-11 RX ADMIN — ROCURONIUM BROMIDE 40 MG: 10 INJECTION INTRAVENOUS at 09:38

## 2018-10-11 RX ADMIN — Medication 0.6 MG: at 11:10

## 2018-10-11 RX ADMIN — HYDROMORPHONE HYDROCHLORIDE 0.5 MG: 1 INJECTION, SOLUTION INTRAMUSCULAR; INTRAVENOUS; SUBCUTANEOUS at 13:51

## 2018-10-11 RX ADMIN — DEXAMETHASONE SODIUM PHOSPHATE 10 MG: 4 INJECTION, SOLUTION INTRAMUSCULAR; INTRAVENOUS at 09:51

## 2018-10-11 RX ADMIN — LABETALOL HYDROCHLORIDE 5 MG: 5 INJECTION INTRAVENOUS at 13:13

## 2018-10-11 RX ADMIN — CIPROFLOXACIN 400 MG: 2 INJECTION, SOLUTION INTRAVENOUS at 09:22

## 2018-10-11 RX ADMIN — ONDANSETRON HYDROCHLORIDE 4 MG: 2 INJECTION, SOLUTION INTRAMUSCULAR; INTRAVENOUS at 09:51

## 2018-10-11 RX ADMIN — PROPOFOL 70 MG: 10 INJECTION, EMULSION INTRAVENOUS at 10:45

## 2018-10-11 RX ADMIN — HYDRALAZINE HYDROCHLORIDE 5 MG: 20 INJECTION INTRAMUSCULAR; INTRAVENOUS at 13:39

## 2018-10-11 RX ADMIN — FENTANYL CITRATE 100 MCG: 50 INJECTION, SOLUTION INTRAMUSCULAR; INTRAVENOUS at 09:38

## 2018-10-11 RX ADMIN — LABETALOL HYDROCHLORIDE 5 MG: 5 INJECTION INTRAVENOUS at 13:35

## 2018-10-11 RX ADMIN — HYDRALAZINE HYDROCHLORIDE 5 MG: 20 INJECTION INTRAMUSCULAR; INTRAVENOUS at 13:53

## 2018-10-11 RX ADMIN — HYDROCODONE BITARTRATE AND ACETAMINOPHEN 1 TABLET: 5; 325 TABLET ORAL at 14:34

## 2018-10-11 ASSESSMENT — PULMONARY FUNCTION TESTS
PIF_VALUE: 0
PIF_VALUE: 30
PIF_VALUE: 1
PIF_VALUE: 30
PIF_VALUE: 2
PIF_VALUE: 27
PIF_VALUE: 29
PIF_VALUE: 5
PIF_VALUE: 29
PIF_VALUE: 30
PIF_VALUE: 31
PIF_VALUE: 30
PIF_VALUE: 35
PIF_VALUE: 30
PIF_VALUE: 1
PIF_VALUE: 29
PIF_VALUE: 31
PIF_VALUE: 30
PIF_VALUE: 34
PIF_VALUE: 35
PIF_VALUE: 30
PIF_VALUE: 28
PIF_VALUE: 29
PIF_VALUE: 28
PIF_VALUE: 33
PIF_VALUE: 29
PIF_VALUE: 35
PIF_VALUE: 33
PIF_VALUE: 1
PIF_VALUE: 34
PIF_VALUE: 1
PIF_VALUE: 30
PIF_VALUE: 34
PIF_VALUE: 29
PIF_VALUE: 27
PIF_VALUE: 33
PIF_VALUE: 32
PIF_VALUE: 31
PIF_VALUE: 32
PIF_VALUE: 34
PIF_VALUE: 31
PIF_VALUE: 30
PIF_VALUE: 30
PIF_VALUE: 29
PIF_VALUE: 25
PIF_VALUE: 27
PIF_VALUE: 35
PIF_VALUE: 30
PIF_VALUE: 2
PIF_VALUE: 29
PIF_VALUE: 35
PIF_VALUE: 33
PIF_VALUE: 35
PIF_VALUE: 28
PIF_VALUE: 34
PIF_VALUE: 32
PIF_VALUE: 32
PIF_VALUE: 33
PIF_VALUE: 30
PIF_VALUE: 27
PIF_VALUE: 29
PIF_VALUE: 32
PIF_VALUE: 29
PIF_VALUE: 27
PIF_VALUE: 31
PIF_VALUE: 29
PIF_VALUE: 29
PIF_VALUE: 3
PIF_VALUE: 31
PIF_VALUE: 30
PIF_VALUE: 35
PIF_VALUE: 32
PIF_VALUE: 31
PIF_VALUE: 30
PIF_VALUE: 28
PIF_VALUE: 26
PIF_VALUE: 34
PIF_VALUE: 37
PIF_VALUE: 30
PIF_VALUE: 32
PIF_VALUE: 31
PIF_VALUE: 33
PIF_VALUE: 24
PIF_VALUE: 31
PIF_VALUE: 31
PIF_VALUE: 3
PIF_VALUE: 32
PIF_VALUE: 0
PIF_VALUE: 34
PIF_VALUE: 30
PIF_VALUE: 35
PIF_VALUE: 30
PIF_VALUE: 32
PIF_VALUE: 27
PIF_VALUE: 36
PIF_VALUE: 31
PIF_VALUE: 19
PIF_VALUE: 34
PIF_VALUE: 28
PIF_VALUE: 3
PIF_VALUE: 32
PIF_VALUE: 27
PIF_VALUE: 35
PIF_VALUE: 34
PIF_VALUE: 35
PIF_VALUE: 2
PIF_VALUE: 35
PIF_VALUE: 28
PIF_VALUE: 28
PIF_VALUE: 30
PIF_VALUE: 0
PIF_VALUE: 31
PIF_VALUE: 2

## 2018-10-11 ASSESSMENT — PAIN DESCRIPTION - DESCRIPTORS
DESCRIPTORS: ACHING;DISCOMFORT
DESCRIPTORS: ACHING;BURNING;CONSTANT
DESCRIPTORS: ACHING;BURNING;CONSTANT
DESCRIPTORS: ACHING;DISCOMFORT
DESCRIPTORS: ACHING;BURNING;CONSTANT
DESCRIPTORS: ACHING;DISCOMFORT

## 2018-10-11 ASSESSMENT — PAIN SCALES - GENERAL
PAINLEVEL_OUTOF10: 6
PAINLEVEL_OUTOF10: 2
PAINLEVEL_OUTOF10: 0
PAINLEVEL_OUTOF10: 0
PAINLEVEL_OUTOF10: 4
PAINLEVEL_OUTOF10: 0
PAINLEVEL_OUTOF10: 4
PAINLEVEL_OUTOF10: 8
PAINLEVEL_OUTOF10: 0
PAINLEVEL_OUTOF10: 8
PAINLEVEL_OUTOF10: 8

## 2018-10-11 ASSESSMENT — PAIN DESCRIPTION - FREQUENCY
FREQUENCY: CONTINUOUS

## 2018-10-11 ASSESSMENT — PAIN DESCRIPTION - LOCATION
LOCATION: ABDOMEN

## 2018-10-11 ASSESSMENT — PAIN - FUNCTIONAL ASSESSMENT: PAIN_FUNCTIONAL_ASSESSMENT: 0-10

## 2018-10-11 ASSESSMENT — PAIN DESCRIPTION - PROGRESSION
CLINICAL_PROGRESSION: GRADUALLY IMPROVING

## 2018-10-11 ASSESSMENT — PAIN DESCRIPTION - ONSET
ONSET: ON-GOING
ONSET: PROGRESSIVE
ONSET: ON-GOING

## 2018-10-11 ASSESSMENT — PAIN DESCRIPTION - ORIENTATION
ORIENTATION: MID

## 2018-10-11 ASSESSMENT — PAIN DESCRIPTION - PAIN TYPE
TYPE: SURGICAL PAIN

## 2018-10-11 NOTE — PROGRESS NOTES
Discharge instructions gone over with patient and patients family all questions answered at this time
remainder of the day due to having had anesthesia. PARKING INSTRUCTIONS:   · [x] Arrival [id number][x] Parking lot 1 is located on [facility] (the corner of [facility] and [facility]). To enter, press the button and the gate will lift. A free token will be provided to exit the lot. One car per patient is allowed to park in this lot. All other cars are to park on [address] [place] [address] Street either in the parking garage or the handicap lot. [] Free  parking is available on [address] [place] [address]. · [] To reach the [facility] lobby from [address] [place] [address], upon entering the hospital, take elevator B to the 3rd floor. EDUCATION INSTRUCTIONS:      [] Knee or hip replacement booklet & exercise pamphlets given. [] Crista Echavarria placed in chart. [] Pre-admission Testing educational folder given  [] Incentive Spirometry,coughing & deep breathing exercises reviewed. []Medication information sheet(s)   []Fluoroscopy-Xray used in surgery reviewed with patient. Educational pamphlet placed in chart. []Pain: Post-op pain is normal and to be expected. You will be asked to rate your pain from 0-10(a zero is not acceptable-education is needed). Your post-op pain goal is:  [] Ask your nurse for your pain medication. [] Joint camp offered. [] Joint replacement booklets given. []Other     MEDICATION INSTRUCTIONS:   [x]Bring a complete list of your medications, please write the last time you took the medicine, give this list to the nurse. [x] Take the following medications the morning of surgery with 1-2 ounces of water:   [] Stop herbal supplements and vitamins 5 days before your surgery. [] DO NOT take any diabetic medicine the morning of surgery. Follow instructions for insulin the day before surgery. [] If you are diabetic and your blood sugar is low or you feel symptomatic, you may drink 1-2 ounces of apple juice or take a glucose tablet.   The morning of your procedure, you may call

## 2018-10-11 NOTE — BRIEF OP NOTE
Brief Postoperative Note  ______________________________________________________________    Patient: Thea Hoyos  YOB: 1945  MRN: [id number]  Date of Procedure: 10/11/2018    Pre-Op Diagnosis: CHOLECYSTITIS    Post-Op Diagnosis: Same       Procedure(s):  LAPAROSCOPIC CHOLECYSTECTOMY, WITH GRAM    Anesthesia: General    Surgeon(s):  Mirella Cardoza MD    Staff:  First Assistant: Dulce Valdes RN  Scrub Person First: Pascale Lockhart     Estimated Blood Loss: * No values recorded between 10/11/2018  9:33 AM and 10/11/2018 19:32 AM * mL    Complications: None    Specimens:   ID Type Source Tests Collected by Time Destination   A : GALLBLADDER Tissue Gallbladder SURGICAL PATHOLOGY Mirella Cardoza MD 10/11/2018 1102        Implants:  * No implants in log *      Drains:      Findings: CBD crystals and cholecystitis with lithiasis intraoperative cholangiogram     Mirella Cardoza MD  Date: 10/11/2018  Time: 11:22 AM

## 2018-10-24 ENCOUNTER — HOSPITAL ENCOUNTER (INPATIENT)
Age: 73
LOS: 3 days | Discharge: HOME | DRG: 445 | End: 2018-10-27
Attending: EMERGENCY MEDICINE | Admitting: INTERNAL MEDICINE
Payer: COMMERCIAL

## 2018-10-24 ENCOUNTER — APPOINTMENT (OUTPATIENT)
Dept: CT IMAGING | Age: 73
DRG: 445 | End: 2018-10-24
Payer: COMMERCIAL

## 2018-10-24 DIAGNOSIS — E78.00 PURE HYPERCHOLESTEROLEMIA: ICD-10-CM

## 2018-10-24 DIAGNOSIS — K80.50 CHOLEDOCHOLITHIASIS: Primary | ICD-10-CM

## 2018-10-24 DIAGNOSIS — R17 JAUNDICE: ICD-10-CM

## 2018-10-24 PROBLEM — D50.9 IRON DEFICIENCY ANEMIA: Status: ACTIVE | Noted: 2018-10-24

## 2018-10-24 PROBLEM — K83.1 OBSTRUCTIVE JAUNDICE: Status: ACTIVE | Noted: 2018-10-24

## 2018-10-24 LAB
ALBUMIN SERPL-MCNC: 3.5 G/DL (ref 3.5–5.2)
ALP BLD-CCNC: 1069 U/L (ref 35–104)
ALT SERPL-CCNC: 60 U/L (ref 0–32)
AMYLASE: 28 U/L (ref 20–100)
ANION GAP SERPL CALCULATED.3IONS-SCNC: 12 MMOL/L (ref 7–16)
APTT: 45.4 SEC (ref 24.5–35.1)
AST SERPL-CCNC: 65 U/L (ref 0–31)
BACTERIA: ABNORMAL /HPF
BASOPHILS ABSOLUTE: 0.05 E9/L (ref 0–0.2)
BASOPHILS RELATIVE PERCENT: 0.7 % (ref 0–2)
BILIRUB SERPL-MCNC: 6 MG/DL (ref 0–1.2)
BILIRUBIN DIRECT: 4.1 MG/DL (ref 0–0.3)
BILIRUBIN URINE: ABNORMAL
BILIRUBIN, INDIRECT: 1.9 MG/DL (ref 0–1)
BLOOD, URINE: ABNORMAL
BUN BLDV-MCNC: 23 MG/DL (ref 8–23)
C-REACTIVE PROTEIN: 4.4 MG/DL (ref 0–0.4)
CALCIUM SERPL-MCNC: 9.2 MG/DL (ref 8.6–10.2)
CHLORIDE BLD-SCNC: 99 MMOL/L (ref 98–107)
CLARITY: CLEAR
CO2: 29 MMOL/L (ref 22–29)
COLOR: YELLOW
CREAT SERPL-MCNC: 1.3 MG/DL (ref 0.5–1)
EOSINOPHILS ABSOLUTE: 0.22 E9/L (ref 0.05–0.5)
EOSINOPHILS RELATIVE PERCENT: 2.9 % (ref 0–6)
FOLATE: 10.8 NG/ML (ref 4.8–24.2)
GFR AFRICAN AMERICAN: 49
GFR NON-AFRICAN AMERICAN: 40 ML/MIN/1.73
GLUCOSE BLD-MCNC: 96 MG/DL (ref 74–109)
GLUCOSE URINE: NEGATIVE MG/DL
HBA1C MFR BLD: 5 % (ref 4–5.6)
HCT VFR BLD CALC: 36.6 % (ref 34–48)
HEMOGLOBIN: 11.6 G/DL (ref 11.5–15.5)
IMMATURE GRANULOCYTES #: 0.03 E9/L
IMMATURE GRANULOCYTES %: 0.4 % (ref 0–5)
INR BLD: 1.5
KETONES, URINE: NEGATIVE MG/DL
LACTIC ACID, SEPSIS: 0.8 MMOL/L (ref 0.5–1.9)
LACTIC ACID: 1.2 MMOL/L (ref 0.5–2.2)
LEUKOCYTE ESTERASE, URINE: ABNORMAL
LIPASE: 22 U/L (ref 13–60)
LYMPHOCYTES ABSOLUTE: 1.25 E9/L (ref 1.5–4)
LYMPHOCYTES RELATIVE PERCENT: 16.5 % (ref 20–42)
MCH RBC QN AUTO: 27.5 PG (ref 26–35)
MCHC RBC AUTO-ENTMCNC: 31.7 % (ref 32–34.5)
MCV RBC AUTO: 86.7 FL (ref 80–99.9)
MONOCYTES ABSOLUTE: 0.66 E9/L (ref 0.1–0.95)
MONOCYTES RELATIVE PERCENT: 8.7 % (ref 2–12)
NEUTROPHILS ABSOLUTE: 5.38 E9/L (ref 1.8–7.3)
NEUTROPHILS RELATIVE PERCENT: 70.8 % (ref 43–80)
NITRITE, URINE: NEGATIVE
PDW BLD-RTO: 18.3 FL (ref 11.5–15)
PH UA: 7 (ref 5–9)
PHOSPHORUS: 4.2 MG/DL (ref 2.5–4.5)
PLATELET # BLD: 374 E9/L (ref 130–450)
PMV BLD AUTO: 11.3 FL (ref 7–12)
POTASSIUM SERPL-SCNC: 3.7 MMOL/L (ref 3.5–5)
PROCALCITONIN: 0.13 NG/ML (ref 0–0.08)
PROTEIN UA: NEGATIVE MG/DL
PROTHROMBIN TIME: 16.4 SEC (ref 9.3–12.4)
RBC # BLD: 4.22 E12/L (ref 3.5–5.5)
RBC UA: >20 /HPF (ref 0–2)
SEDIMENTATION RATE, ERYTHROCYTE: 87 MM/HR (ref 0–20)
SODIUM BLD-SCNC: 140 MMOL/L (ref 132–146)
SPECIFIC GRAVITY UA: <=1.005 (ref 1–1.03)
TOTAL PROTEIN: 6.9 G/DL (ref 6.4–8.3)
UROBILINOGEN, URINE: 1 E.U./DL
VITAMIN B-12: 606 PG/ML (ref 211–946)
WBC # BLD: 7.6 E9/L (ref 4.5–11.5)
WBC UA: ABNORMAL /HPF (ref 0–5)

## 2018-10-24 PROCEDURE — 74177 CT ABD & PELVIS W/CONTRAST: CPT

## 2018-10-24 PROCEDURE — 80048 BASIC METABOLIC PNL TOTAL CA: CPT

## 2018-10-24 PROCEDURE — 6370000000 HC RX 637 (ALT 250 FOR IP): Performed by: INTERNAL MEDICINE

## 2018-10-24 PROCEDURE — 6360000002 HC RX W HCPCS: Performed by: INTERNAL MEDICINE

## 2018-10-24 PROCEDURE — 86140 C-REACTIVE PROTEIN: CPT

## 2018-10-24 PROCEDURE — 85025 COMPLETE CBC W/AUTO DIFF WBC: CPT

## 2018-10-24 PROCEDURE — 87040 BLOOD CULTURE FOR BACTERIA: CPT

## 2018-10-24 PROCEDURE — 36415 COLL VENOUS BLD VENIPUNCTURE: CPT

## 2018-10-24 PROCEDURE — 84145 PROCALCITONIN (PCT): CPT

## 2018-10-24 PROCEDURE — 82150 ASSAY OF AMYLASE: CPT

## 2018-10-24 PROCEDURE — 82746 ASSAY OF FOLIC ACID SERUM: CPT

## 2018-10-24 PROCEDURE — 83036 HEMOGLOBIN GLYCOSYLATED A1C: CPT

## 2018-10-24 PROCEDURE — 87088 URINE BACTERIA CULTURE: CPT

## 2018-10-24 PROCEDURE — 2580000003 HC RX 258: Performed by: EMERGENCY MEDICINE

## 2018-10-24 PROCEDURE — 99284 EMERGENCY DEPT VISIT MOD MDM: CPT

## 2018-10-24 PROCEDURE — 85651 RBC SED RATE NONAUTOMATED: CPT

## 2018-10-24 PROCEDURE — 83605 ASSAY OF LACTIC ACID: CPT

## 2018-10-24 PROCEDURE — 85730 THROMBOPLASTIN TIME PARTIAL: CPT

## 2018-10-24 PROCEDURE — 84100 ASSAY OF PHOSPHORUS: CPT

## 2018-10-24 PROCEDURE — 83690 ASSAY OF LIPASE: CPT

## 2018-10-24 PROCEDURE — 82607 VITAMIN B-12: CPT

## 2018-10-24 PROCEDURE — 81001 URINALYSIS AUTO W/SCOPE: CPT

## 2018-10-24 PROCEDURE — 1200000000 HC SEMI PRIVATE

## 2018-10-24 PROCEDURE — 85610 PROTHROMBIN TIME: CPT

## 2018-10-24 PROCEDURE — 2580000003 HC RX 258: Performed by: INTERNAL MEDICINE

## 2018-10-24 PROCEDURE — 6360000004 HC RX CONTRAST MEDICATION: Performed by: RADIOLOGY

## 2018-10-24 PROCEDURE — 80076 HEPATIC FUNCTION PANEL: CPT

## 2018-10-24 RX ORDER — HYDROCHLOROTHIAZIDE 25 MG/1
25 TABLET ORAL DAILY
Status: DISCONTINUED | OUTPATIENT
Start: 2018-10-25 | End: 2018-10-25

## 2018-10-24 RX ORDER — METOPROLOL SUCCINATE 25 MG/1
25 TABLET, EXTENDED RELEASE ORAL EVERY MORNING
Status: DISCONTINUED | OUTPATIENT
Start: 2018-10-25 | End: 2018-10-27 | Stop reason: HOSPADM

## 2018-10-24 RX ORDER — LEVOTHYROXINE SODIUM 0.07 MG/1
150 TABLET ORAL EVERY MORNING
Status: DISCONTINUED | OUTPATIENT
Start: 2018-10-25 | End: 2018-10-27 | Stop reason: HOSPADM

## 2018-10-24 RX ORDER — 0.9 % SODIUM CHLORIDE 0.9 %
1000 INTRAVENOUS SOLUTION INTRAVENOUS ONCE
Status: COMPLETED | OUTPATIENT
Start: 2018-10-24 | End: 2018-10-24

## 2018-10-24 RX ORDER — LEVOTHYROXINE SODIUM 0.15 MG/1
150 TABLET ORAL EVERY MORNING
COMMUNITY

## 2018-10-24 RX ORDER — SODIUM CHLORIDE 9 MG/ML
INJECTION, SOLUTION INTRAVENOUS CONTINUOUS
Status: DISCONTINUED | OUTPATIENT
Start: 2018-10-24 | End: 2018-10-27 | Stop reason: HOSPADM

## 2018-10-24 RX ORDER — HEPARIN SODIUM 10000 [USP'U]/ML
5000 INJECTION, SOLUTION INTRAVENOUS; SUBCUTANEOUS EVERY 8 HOURS
Status: DISCONTINUED | OUTPATIENT
Start: 2018-10-24 | End: 2018-10-27 | Stop reason: HOSPADM

## 2018-10-24 RX ORDER — MORPHINE SULFATE 2 MG/ML
4 INJECTION, SOLUTION INTRAMUSCULAR; INTRAVENOUS
Status: DISCONTINUED | OUTPATIENT
Start: 2018-10-24 | End: 2018-10-27 | Stop reason: HOSPADM

## 2018-10-24 RX ORDER — MORPHINE SULFATE 2 MG/ML
2 INJECTION, SOLUTION INTRAMUSCULAR; INTRAVENOUS
Status: DISCONTINUED | OUTPATIENT
Start: 2018-10-24 | End: 2018-10-27 | Stop reason: HOSPADM

## 2018-10-24 RX ORDER — ACETAMINOPHEN 325 MG/1
650 TABLET ORAL EVERY 4 HOURS PRN
Status: DISCONTINUED | OUTPATIENT
Start: 2018-10-24 | End: 2018-10-27 | Stop reason: HOSPADM

## 2018-10-24 RX ORDER — LEVETIRACETAM 500 MG/1
500 TABLET ORAL NIGHTLY
Status: DISCONTINUED | OUTPATIENT
Start: 2018-10-24 | End: 2018-10-27 | Stop reason: HOSPADM

## 2018-10-24 RX ORDER — LOSARTAN POTASSIUM 50 MG/1
100 TABLET ORAL DAILY
Status: DISCONTINUED | OUTPATIENT
Start: 2018-10-25 | End: 2018-10-25

## 2018-10-24 RX ORDER — 0.9 % SODIUM CHLORIDE 0.9 %
10 VIAL (ML) INJECTION PRN
Status: DISCONTINUED | OUTPATIENT
Start: 2018-10-24 | End: 2018-10-27 | Stop reason: HOSPADM

## 2018-10-24 RX ORDER — GABAPENTIN 300 MG/1
300 CAPSULE ORAL 2 TIMES DAILY
Status: DISCONTINUED | OUTPATIENT
Start: 2018-10-24 | End: 2018-10-27 | Stop reason: HOSPADM

## 2018-10-24 RX ORDER — PANTOPRAZOLE SODIUM 40 MG/1
40 TABLET, DELAYED RELEASE ORAL
Status: DISCONTINUED | OUTPATIENT
Start: 2018-10-24 | End: 2018-10-27 | Stop reason: HOSPADM

## 2018-10-24 RX ORDER — URSODIOL 300 MG/1
1 CAPSULE ORAL EVERY MORNING
Refills: 0 | COMMUNITY
Start: 2018-10-19 | End: 2021-03-16

## 2018-10-24 RX ORDER — FLUOXETINE HYDROCHLORIDE 20 MG/1
20 CAPSULE ORAL EVERY MORNING
Status: DISCONTINUED | OUTPATIENT
Start: 2018-10-25 | End: 2018-10-27 | Stop reason: HOSPADM

## 2018-10-24 RX ORDER — LOSARTAN POTASSIUM AND HYDROCHLOROTHIAZIDE 25; 100 MG/1; MG/1
1 TABLET ORAL EVERY MORNING
Status: DISCONTINUED | OUTPATIENT
Start: 2018-10-25 | End: 2018-10-24 | Stop reason: CLARIF

## 2018-10-24 RX ORDER — METOCLOPRAMIDE 10 MG/1
5 TABLET ORAL 4 TIMES DAILY PRN
Status: DISCONTINUED | OUTPATIENT
Start: 2018-10-24 | End: 2018-10-27 | Stop reason: HOSPADM

## 2018-10-24 RX ADMIN — SODIUM CHLORIDE: 9 INJECTION, SOLUTION INTRAVENOUS at 17:09

## 2018-10-24 RX ADMIN — HEPARIN SODIUM 5000 UNITS: 10000 INJECTION, SOLUTION INTRAVENOUS; SUBCUTANEOUS at 17:10

## 2018-10-24 RX ADMIN — IOPAMIDOL 110 ML: 755 INJECTION, SOLUTION INTRAVENOUS at 11:16

## 2018-10-24 RX ADMIN — SODIUM CHLORIDE 1000 ML: 9 INJECTION, SOLUTION INTRAVENOUS at 10:44

## 2018-10-24 RX ADMIN — PANTOPRAZOLE SODIUM 40 MG: 40 TABLET, DELAYED RELEASE ORAL at 17:08

## 2018-10-24 RX ADMIN — LEVETIRACETAM 500 MG: 500 TABLET ORAL at 21:32

## 2018-10-24 RX ADMIN — MORPHINE SULFATE 2 MG: 2 INJECTION, SOLUTION INTRAMUSCULAR; INTRAVENOUS at 21:31

## 2018-10-24 RX ADMIN — GABAPENTIN 300 MG: 300 CAPSULE ORAL at 21:32

## 2018-10-24 ASSESSMENT — PAIN SCALES - GENERAL
PAINLEVEL_OUTOF10: 0
PAINLEVEL_OUTOF10: 7

## 2018-10-24 ASSESSMENT — PAIN DESCRIPTION - FREQUENCY: FREQUENCY: INTERMITTENT

## 2018-10-24 ASSESSMENT — PAIN DESCRIPTION - ONSET: ONSET: GRADUAL

## 2018-10-24 ASSESSMENT — PAIN DESCRIPTION - ORIENTATION: ORIENTATION: LOWER

## 2018-10-24 ASSESSMENT — PAIN DESCRIPTION - LOCATION: LOCATION: BACK

## 2018-10-24 ASSESSMENT — PAIN DESCRIPTION - DESCRIPTORS: DESCRIPTORS: ACHING;DISCOMFORT;SORE

## 2018-10-24 ASSESSMENT — PAIN DESCRIPTION - PROGRESSION: CLINICAL_PROGRESSION: NOT CHANGED

## 2018-10-24 ASSESSMENT — PAIN DESCRIPTION - PAIN TYPE: TYPE: ACUTE PAIN

## 2018-10-25 ENCOUNTER — ANESTHESIA EVENT (OUTPATIENT)
Dept: ENDOSCOPY | Age: 73
DRG: 445 | End: 2018-10-25
Payer: COMMERCIAL

## 2018-10-25 PROBLEM — L29.9 PRURITUS: Status: ACTIVE | Noted: 2018-10-25

## 2018-10-25 PROBLEM — I95.9 HYPOTENSION: Status: ACTIVE | Noted: 2018-10-25

## 2018-10-25 LAB
ALBUMIN SERPL-MCNC: 2.9 G/DL (ref 3.5–5.2)
ALP BLD-CCNC: 988 U/L (ref 35–104)
ALT SERPL-CCNC: 68 U/L (ref 0–32)
ANION GAP SERPL CALCULATED.3IONS-SCNC: 10 MMOL/L (ref 7–16)
AST SERPL-CCNC: 101 U/L (ref 0–31)
BASOPHILS ABSOLUTE: 0.04 E9/L (ref 0–0.2)
BASOPHILS RELATIVE PERCENT: 0.4 % (ref 0–2)
BILIRUB SERPL-MCNC: 6.6 MG/DL (ref 0–1.2)
BILIRUBIN DIRECT: 5.4 MG/DL (ref 0–0.3)
BILIRUBIN, INDIRECT: 1.2 MG/DL (ref 0–1)
BUN BLDV-MCNC: 20 MG/DL (ref 8–23)
CALCIUM SERPL-MCNC: 8.4 MG/DL (ref 8.6–10.2)
CHLORIDE BLD-SCNC: 97 MMOL/L (ref 98–107)
CHOLESTEROL, TOTAL: 203 MG/DL (ref 0–199)
CO2: 26 MMOL/L (ref 22–29)
CREAT SERPL-MCNC: 1.1 MG/DL (ref 0.5–1)
EKG ATRIAL RATE: 64 BPM
EKG P AXIS: 52 DEGREES
EKG P-R INTERVAL: 176 MS
EKG Q-T INTERVAL: 402 MS
EKG QRS DURATION: 90 MS
EKG QTC CALCULATION (BAZETT): 414 MS
EKG R AXIS: -7 DEGREES
EKG T AXIS: 28 DEGREES
EKG VENTRICULAR RATE: 64 BPM
EOSINOPHILS ABSOLUTE: 0.07 E9/L (ref 0.05–0.5)
EOSINOPHILS RELATIVE PERCENT: 0.8 % (ref 0–6)
GFR AFRICAN AMERICAN: 59
GFR NON-AFRICAN AMERICAN: 49 ML/MIN/1.73
GLUCOSE BLD-MCNC: 107 MG/DL (ref 74–109)
HCT VFR BLD CALC: 31.1 % (ref 34–48)
HDLC SERPL-MCNC: 28 MG/DL
HEMOGLOBIN: 10 G/DL (ref 11.5–15.5)
IMMATURE GRANULOCYTES #: 0.04 E9/L
IMMATURE GRANULOCYTES %: 0.4 % (ref 0–5)
LDL CHOLESTEROL CALCULATED: 142 MG/DL (ref 0–99)
LYMPHOCYTES ABSOLUTE: 1.01 E9/L (ref 1.5–4)
LYMPHOCYTES RELATIVE PERCENT: 11 % (ref 20–42)
MAGNESIUM: 2.1 MG/DL (ref 1.6–2.6)
MCH RBC QN AUTO: 27.7 PG (ref 26–35)
MCHC RBC AUTO-ENTMCNC: 32.2 % (ref 32–34.5)
MCV RBC AUTO: 86.1 FL (ref 80–99.9)
MONOCYTES ABSOLUTE: 0.73 E9/L (ref 0.1–0.95)
MONOCYTES RELATIVE PERCENT: 7.9 % (ref 2–12)
NEUTROPHILS ABSOLUTE: 7.32 E9/L (ref 1.8–7.3)
NEUTROPHILS RELATIVE PERCENT: 79.5 % (ref 43–80)
PDW BLD-RTO: 18.3 FL (ref 11.5–15)
PLATELET # BLD: 327 E9/L (ref 130–450)
PMV BLD AUTO: 12 FL (ref 7–12)
POTASSIUM REFLEX MAGNESIUM: 3.4 MMOL/L (ref 3.5–5)
RBC # BLD: 3.61 E12/L (ref 3.5–5.5)
SODIUM BLD-SCNC: 133 MMOL/L (ref 132–146)
TOTAL PROTEIN: 5.9 G/DL (ref 6.4–8.3)
TRIGL SERPL-MCNC: 164 MG/DL (ref 0–149)
TSH SERPL DL<=0.05 MIU/L-ACNC: 0.86 UIU/ML (ref 0.27–4.2)
URIC ACID, SERUM: 4.6 MG/DL (ref 2.4–5.7)
VLDLC SERPL CALC-MCNC: 33 MG/DL
WBC # BLD: 9.2 E9/L (ref 4.5–11.5)

## 2018-10-25 PROCEDURE — 1200000000 HC SEMI PRIVATE

## 2018-10-25 PROCEDURE — 6370000000 HC RX 637 (ALT 250 FOR IP): Performed by: INTERNAL MEDICINE

## 2018-10-25 PROCEDURE — 80048 BASIC METABOLIC PNL TOTAL CA: CPT

## 2018-10-25 PROCEDURE — 6360000002 HC RX W HCPCS: Performed by: INTERNAL MEDICINE

## 2018-10-25 PROCEDURE — 36415 COLL VENOUS BLD VENIPUNCTURE: CPT

## 2018-10-25 PROCEDURE — 80076 HEPATIC FUNCTION PANEL: CPT

## 2018-10-25 PROCEDURE — 83735 ASSAY OF MAGNESIUM: CPT

## 2018-10-25 PROCEDURE — 93005 ELECTROCARDIOGRAM TRACING: CPT | Performed by: INTERNAL MEDICINE

## 2018-10-25 PROCEDURE — 84443 ASSAY THYROID STIM HORMONE: CPT

## 2018-10-25 PROCEDURE — 80061 LIPID PANEL: CPT

## 2018-10-25 PROCEDURE — 85025 COMPLETE CBC W/AUTO DIFF WBC: CPT

## 2018-10-25 PROCEDURE — 84550 ASSAY OF BLOOD/URIC ACID: CPT

## 2018-10-25 PROCEDURE — 2580000003 HC RX 258: Performed by: INTERNAL MEDICINE

## 2018-10-25 RX ORDER — POTASSIUM CHLORIDE 750 MG/1
10 TABLET, EXTENDED RELEASE ORAL 3 TIMES DAILY
Status: DISCONTINUED | OUTPATIENT
Start: 2018-10-25 | End: 2018-10-27 | Stop reason: HOSPADM

## 2018-10-25 RX ORDER — CHOLESTYRAMINE 4 G/9G
1 POWDER, FOR SUSPENSION ORAL 2 TIMES DAILY
Status: DISCONTINUED | OUTPATIENT
Start: 2018-10-25 | End: 2018-10-27 | Stop reason: HOSPADM

## 2018-10-25 RX ORDER — CIPROFLOXACIN 2 MG/ML
400 INJECTION, SOLUTION INTRAVENOUS SEE ADMIN INSTRUCTIONS
Status: COMPLETED | OUTPATIENT
Start: 2018-10-26 | End: 2018-10-26

## 2018-10-25 RX ORDER — 0.9 % SODIUM CHLORIDE 0.9 %
500 INTRAVENOUS SOLUTION INTRAVENOUS ONCE
Status: COMPLETED | OUTPATIENT
Start: 2018-10-25 | End: 2018-10-25

## 2018-10-25 RX ORDER — DIAPER,BRIEF,INFANT-TODD,DISP
EACH MISCELLANEOUS 3 TIMES DAILY
Status: DISCONTINUED | OUTPATIENT
Start: 2018-10-25 | End: 2018-10-25

## 2018-10-25 RX ORDER — DIAPER,BRIEF,INFANT-TODD,DISP
EACH MISCELLANEOUS 3 TIMES DAILY PRN
Status: DISCONTINUED | OUTPATIENT
Start: 2018-10-25 | End: 2018-10-27 | Stop reason: HOSPADM

## 2018-10-25 RX ORDER — DIPHENHYDRAMINE HCL 25 MG
25 TABLET ORAL EVERY 8 HOURS PRN
Status: DISCONTINUED | OUTPATIENT
Start: 2018-10-25 | End: 2018-10-27 | Stop reason: HOSPADM

## 2018-10-25 RX ADMIN — GABAPENTIN 300 MG: 300 CAPSULE ORAL at 20:51

## 2018-10-25 RX ADMIN — SODIUM CHLORIDE 500 ML: 9 INJECTION, SOLUTION INTRAVENOUS at 13:40

## 2018-10-25 RX ADMIN — PANTOPRAZOLE SODIUM 40 MG: 40 TABLET, DELAYED RELEASE ORAL at 17:01

## 2018-10-25 RX ADMIN — POTASSIUM CHLORIDE 10 MEQ: 750 TABLET, EXTENDED RELEASE ORAL at 20:51

## 2018-10-25 RX ADMIN — GABAPENTIN 300 MG: 300 CAPSULE ORAL at 08:51

## 2018-10-25 RX ADMIN — PANTOPRAZOLE SODIUM 40 MG: 40 TABLET, DELAYED RELEASE ORAL at 06:18

## 2018-10-25 RX ADMIN — POTASSIUM CHLORIDE 10 MEQ: 750 TABLET, EXTENDED RELEASE ORAL at 15:00

## 2018-10-25 RX ADMIN — FLUOXETINE HYDROCHLORIDE 20 MG: 20 CAPSULE ORAL at 08:51

## 2018-10-25 RX ADMIN — LEVOTHYROXINE SODIUM 150 MCG: 75 TABLET ORAL at 06:18

## 2018-10-25 RX ADMIN — SODIUM CHLORIDE 500 ML: 9 INJECTION, SOLUTION INTRAVENOUS at 06:33

## 2018-10-25 RX ADMIN — HEPARIN SODIUM 5000 UNITS: 10000 INJECTION, SOLUTION INTRAVENOUS; SUBCUTANEOUS at 00:06

## 2018-10-25 RX ADMIN — CHOLESTYRAMINE 4 G: 4 POWDER, FOR SUSPENSION ORAL at 20:51

## 2018-10-25 RX ADMIN — HEPARIN SODIUM 5000 UNITS: 10000 INJECTION, SOLUTION INTRAVENOUS; SUBCUTANEOUS at 08:52

## 2018-10-25 RX ADMIN — CHOLESTYRAMINE 4 G: 4 POWDER, FOR SUSPENSION ORAL at 15:00

## 2018-10-25 RX ADMIN — HYDROCORTISONE: 1 CREAM TOPICAL at 18:31

## 2018-10-25 RX ADMIN — DIPHENHYDRAMINE HCL 25 MG: 25 TABLET ORAL at 15:00

## 2018-10-25 RX ADMIN — LEVETIRACETAM 500 MG: 500 TABLET ORAL at 20:51

## 2018-10-25 ASSESSMENT — PAIN SCALES - GENERAL: PAINLEVEL_OUTOF10: 0

## 2018-10-25 NOTE — CONSULTS
Consults   Gastroenterology Consult Note   Mirtha SMITH NP-C with Sabina Wood M.D. Consult Note        Date of Service: 10/25/2018  Reason for Consult: jaundice and choledocholithiasis  Requesting Physician: Mateo Jones    CHIEF COMPLAINT:  JAUNDICE    History Obtained From:  patient, electronic medical record    HISTORY OF PRESENT ILLNESS:       Dagmar Peoples is a 68 y.o. female with significant past medical history of thyroid disease, sleep apnea, seizures, obstructive jaundice, obesity, meningioma, iron def anemia, HTN, HLD, MI, GERD, depression, chest pain, CAD, arthritis, and anxiety admitted via ED for pruritus with associated elevated liver function studies 2 weeks after a cholecystectomy, beginning 1 week ago. Patient states she had needed her gallbladder removed since April this year, but was unable to have surgery because of severe FE deficiency anemia. She was receiving weekly transfusions for several months, and then underwent cardioversion in September. Pt reports having her gallbladder removed 2 weeks ago with Dr. Celestine South, was doing well postop, had follow up appointment on Friday. States she felt good Friday, \"went and ate a steak at [employer]\". Reports she woke up Saturday morning and \"I looked like somebody colored me with a highlighter\". She began having pruritus, and abdominal pain. States the abdominal pain was in the entire stomach rated it at a 8.5/10. Patient denies any nausea or vomiting episodes. Reports to a poor appetite, losing 35# unintentional since April this year; \"I get full really fast\". She moves her bowels daily of soft, brown stools. Reports her BMs over the last several days, have been light brown in color. Denies any melena, or hematochezia. She denies feeling fever or chilled, reports she has been around her grand kids whom have been sick with head colds. She denies any prior episodes of such. She reports to taking Tylenol OTC postop for pain control.  She denies

## 2018-10-25 NOTE — ANESTHESIA PRE PROCEDURE
Department of Anesthesiology  Preprocedure Note       Name:  Ama Smith   Age:  68 y.o.  :  1945                                          MRN:  [id number]         Date:  10/25/2018      Surgeon: Geoff Pyle):  Noemi Wallace MD    Procedure: ERCP ENDOSCOPIC RETROGRADE CHOLANGIOPANCREATOGRAPHY (N/A )    Medications prior to admission:   Prior to Admission medications    Medication Sig Start Date End Date Taking? Authorizing Provider   ursodiol (ACTIGALL) 300 MG capsule Take 1 capsule by mouth every morning 10/19/18  Yes Historical Provider, MD   levothyroxine (SYNTHROID) 150 MCG tablet Take 150 mcg by mouth every morning   Yes Historical Provider, MD   apixaban (ELIQUIS) 5 MG TABS tablet Take 1 tablet by mouth 2 times daily Resume medication in 48 hours  Patient taking differently: Take 5 mg by mouth 2 times daily  10/11/18  Yes Douglas Bryant MD   acetaminophen (TYLENOL) 325 MG tablet Take 650 mg by mouth every 6 hours as needed for Pain   Yes Historical Provider, MD   gabapentin (NEURONTIN) 300 MG capsule Take 300 mg by mouth 2 times daily.  .   Yes Historical Provider, MD   FLUoxetine (PROZAC) 20 MG capsule Take 20 mg by mouth every morning    Yes Historical Provider, MD   omeprazole (PRILOSEC) 20 MG delayed release capsule Take 20 mg by mouth daily as needed    Yes Historical Provider, MD   metoprolol succinate (TOPROL XL) 25 MG extended release tablet Take 1 tablet by mouth daily  Patient taking differently: Take 25 mg by mouth every morning  18  Yes Loc Dallas MD   metoclopramide (REGLAN) 5 MG tablet Take 1 tablet by mouth 3 times daily (before meals)  Patient taking differently: Take 5 mg by mouth 4 times daily as needed  18  Yes Loc Dallas MD   levETIRAcetam (KEPPRA) 500 MG tablet Take 1 tablet by mouth 2 times daily  Patient taking differently: Take 500 mg by mouth nightly  17  Yes Earline Stahl MD   losartan-hydrochlorothiazide (HYZAAR) 100-25 MG per tablet Take 1

## 2018-10-25 NOTE — ED PROVIDER NOTES
HPI:  10/24/18,   Time: 8:54 PM         Ernesto Shirley is a 68 y.o. female presenting to the ED for undescended and pruritus with associated elevated liver function studies 2 weeks after a cholecystectomy, beginning 1 week ago. The complaint has been persistent, moderate in severity, and worsened by nothing. ROS:   Pertinent positives and negatives are stated within HPI, all other systems reviewed and are negative.  --------------------------------------------- PAST HISTORY ---------------------------------------------  Past Medical History:  has a past medical history of Anxiety; Arthritis; Back pain; CAD (coronary artery disease); Cancer ([address] Chest pain; Depression; Gastroesophageal reflux disease without esophagitis; Headache(784.0); Heart attack ([address] Hyperlipidemia; Hypertension; Hypothyroid; Iron deficiency anemia; Meningioma ([address] Obesity; Obstructive jaundice; Palpitations; Seizure disorder ([address] Sleep apnea; and Thyroid disease. Past Surgical History:  has a past surgical history that includes Brain meningioma excision (2003, 2010); Hysterectomy; craniotomy (Left, 08/11/2016); Tonsillectomy and adenoidectomy; Colonoscopy (12/16/2015); eye surgery (Bilateral); Upper gastrointestinal endoscopy; pr colonoscopy flx dx w/collj spec when pfrmd (N/A, 6/11/2018); transesophageal echocardiogram (09/06/2018); Cardioversion (09/06/2018); and pr lap,cholecystectomy/graph (N/A, 10/11/2018). Social History:  reports that she quit smoking about 23 years ago. Her smoking use included Cigarettes. She started smoking about 61 years ago. She smoked 2.00 packs per day. She has never used smokeless tobacco. She reports that she drinks alcohol. She reports that she does not use drugs. Family History: family history includes Arrhythmia in her mother; Cancer in her sister; Diabetes in her mother; Heart Disease in her father. The patients home medications have been reviewed.     Allergies: Pcn

## 2018-10-26 ENCOUNTER — APPOINTMENT (OUTPATIENT)
Dept: GENERAL RADIOLOGY | Age: 73
DRG: 445 | End: 2018-10-26
Payer: COMMERCIAL

## 2018-10-26 ENCOUNTER — ANESTHESIA (OUTPATIENT)
Dept: ENDOSCOPY | Age: 73
DRG: 445 | End: 2018-10-26
Payer: COMMERCIAL

## 2018-10-26 VITALS — OXYGEN SATURATION: 99 % | DIASTOLIC BLOOD PRESSURE: 88 MMHG | SYSTOLIC BLOOD PRESSURE: 184 MMHG

## 2018-10-26 PROBLEM — R00.1 SINUS BRADYCARDIA: Status: ACTIVE | Noted: 2018-10-26

## 2018-10-26 LAB
ALBUMIN SERPL-MCNC: 2.9 G/DL (ref 3.5–5.2)
ALP BLD-CCNC: 880 U/L (ref 35–104)
ALT SERPL-CCNC: 67 U/L (ref 0–32)
ANION GAP SERPL CALCULATED.3IONS-SCNC: 8 MMOL/L (ref 7–16)
APTT: 33.2 SEC (ref 24.5–35.1)
AST SERPL-CCNC: 85 U/L (ref 0–31)
BILIRUB SERPL-MCNC: 6.5 MG/DL (ref 0–1.2)
BILIRUBIN DIRECT: 5.1 MG/DL (ref 0–0.3)
BILIRUBIN, INDIRECT: 1.4 MG/DL (ref 0–1)
BUN BLDV-MCNC: 15 MG/DL (ref 8–23)
CALCIUM SERPL-MCNC: 8.3 MG/DL (ref 8.6–10.2)
CHLORIDE BLD-SCNC: 104 MMOL/L (ref 98–107)
CO2: 27 MMOL/L (ref 22–29)
CREAT SERPL-MCNC: 0.9 MG/DL (ref 0.5–1)
EKG ATRIAL RATE: 56 BPM
EKG P AXIS: 53 DEGREES
EKG P-R INTERVAL: 166 MS
EKG Q-T INTERVAL: 458 MS
EKG QRS DURATION: 96 MS
EKG QTC CALCULATION (BAZETT): 441 MS
EKG R AXIS: -5 DEGREES
EKG T AXIS: 10 DEGREES
EKG VENTRICULAR RATE: 56 BPM
GFR AFRICAN AMERICAN: >60
GFR NON-AFRICAN AMERICAN: >60 ML/MIN/1.73
GLUCOSE BLD-MCNC: 90 MG/DL (ref 74–109)
INR BLD: 1.1
PHOSPHORUS: 3.2 MG/DL (ref 2.5–4.5)
POTASSIUM REFLEX MAGNESIUM: 3.6 MMOL/L (ref 3.5–5)
PROTHROMBIN TIME: 12.7 SEC (ref 9.3–12.4)
SODIUM BLD-SCNC: 139 MMOL/L (ref 132–146)
TOTAL PROTEIN: 5.8 G/DL (ref 6.4–8.3)
URINE CULTURE, ROUTINE: NORMAL

## 2018-10-26 PROCEDURE — 7100000010 HC PHASE II RECOVERY - FIRST 15 MIN: Performed by: INTERNAL MEDICINE

## 2018-10-26 PROCEDURE — 3609015200 HC ERCP REMOVE CALCULI/DEBRIS BILIARY/PANCREAS DUCT: Performed by: INTERNAL MEDICINE

## 2018-10-26 PROCEDURE — 2580000003 HC RX 258: Performed by: INTERNAL MEDICINE

## 2018-10-26 PROCEDURE — 6370000000 HC RX 637 (ALT 250 FOR IP): Performed by: INTERNAL MEDICINE

## 2018-10-26 PROCEDURE — 74330 X-RAY BILE/PANC ENDOSCOPY: CPT

## 2018-10-26 PROCEDURE — 80076 HEPATIC FUNCTION PANEL: CPT

## 2018-10-26 PROCEDURE — 1200000000 HC SEMI PRIVATE

## 2018-10-26 PROCEDURE — 6360000002 HC RX W HCPCS: Performed by: NURSE ANESTHETIST, CERTIFIED REGISTERED

## 2018-10-26 PROCEDURE — 85730 THROMBOPLASTIN TIME PARTIAL: CPT

## 2018-10-26 PROCEDURE — 6360000004 HC RX CONTRAST MEDICATION: Performed by: INTERNAL MEDICINE

## 2018-10-26 PROCEDURE — 6360000002 HC RX W HCPCS: Performed by: NURSE PRACTITIONER

## 2018-10-26 PROCEDURE — 6360000002 HC RX W HCPCS

## 2018-10-26 PROCEDURE — 6370000000 HC RX 637 (ALT 250 FOR IP): Performed by: NURSE PRACTITIONER

## 2018-10-26 PROCEDURE — 2709999900 HC NON-CHARGEABLE SUPPLY: Performed by: INTERNAL MEDICINE

## 2018-10-26 PROCEDURE — 36415 COLL VENOUS BLD VENIPUNCTURE: CPT

## 2018-10-26 PROCEDURE — 2500000003 HC RX 250 WO HCPCS: Performed by: NURSE ANESTHETIST, CERTIFIED REGISTERED

## 2018-10-26 PROCEDURE — 84100 ASSAY OF PHOSPHORUS: CPT

## 2018-10-26 PROCEDURE — 80048 BASIC METABOLIC PNL TOTAL CA: CPT

## 2018-10-26 PROCEDURE — 3609014900 HC ERCP W/SPHINCTEROTOMY &/OR PAPILLOTOMY: Performed by: INTERNAL MEDICINE

## 2018-10-26 PROCEDURE — 3700000001 HC ADD 15 MINUTES (ANESTHESIA): Performed by: INTERNAL MEDICINE

## 2018-10-26 PROCEDURE — 3700000000 HC ANESTHESIA ATTENDED CARE: Performed by: INTERNAL MEDICINE

## 2018-10-26 PROCEDURE — 7100000011 HC PHASE II RECOVERY - ADDTL 15 MIN: Performed by: INTERNAL MEDICINE

## 2018-10-26 PROCEDURE — 85610 PROTHROMBIN TIME: CPT

## 2018-10-26 PROCEDURE — 6360000002 HC RX W HCPCS: Performed by: INTERNAL MEDICINE

## 2018-10-26 PROCEDURE — 2720000010 HC SURG SUPPLY STERILE: Performed by: INTERNAL MEDICINE

## 2018-10-26 PROCEDURE — C1769 GUIDE WIRE: HCPCS | Performed by: INTERNAL MEDICINE

## 2018-10-26 RX ORDER — ONDANSETRON 2 MG/ML
INJECTION INTRAMUSCULAR; INTRAVENOUS PRN
Status: DISCONTINUED | OUTPATIENT
Start: 2018-10-26 | End: 2018-10-26 | Stop reason: SDUPTHER

## 2018-10-26 RX ORDER — ONDANSETRON 2 MG/ML
4 INJECTION INTRAMUSCULAR; INTRAVENOUS EVERY 6 HOURS PRN
Status: DISCONTINUED | OUTPATIENT
Start: 2018-10-26 | End: 2018-10-27 | Stop reason: HOSPADM

## 2018-10-26 RX ORDER — PROPOFOL 10 MG/ML
INJECTION, EMULSION INTRAVENOUS PRN
Status: DISCONTINUED | OUTPATIENT
Start: 2018-10-26 | End: 2018-10-26 | Stop reason: SDUPTHER

## 2018-10-26 RX ORDER — DEXAMETHASONE SODIUM PHOSPHATE 4 MG/ML
INJECTION, SOLUTION INTRA-ARTICULAR; INTRALESIONAL; INTRAMUSCULAR; INTRAVENOUS; SOFT TISSUE PRN
Status: DISCONTINUED | OUTPATIENT
Start: 2018-10-26 | End: 2018-10-26 | Stop reason: SDUPTHER

## 2018-10-26 RX ORDER — LIDOCAINE HYDROCHLORIDE 20 MG/ML
INJECTION, SOLUTION INFILTRATION; PERINEURAL PRN
Status: DISCONTINUED | OUTPATIENT
Start: 2018-10-26 | End: 2018-10-26 | Stop reason: SDUPTHER

## 2018-10-26 RX ORDER — PROMETHAZINE HYDROCHLORIDE 25 MG/ML
12.5 INJECTION, SOLUTION INTRAMUSCULAR; INTRAVENOUS EVERY 6 HOURS PRN
Status: DISCONTINUED | OUTPATIENT
Start: 2018-10-26 | End: 2018-10-27 | Stop reason: HOSPADM

## 2018-10-26 RX ORDER — GLYCOPYRROLATE 0.2 MG/ML
INJECTION INTRAMUSCULAR; INTRAVENOUS PRN
Status: DISCONTINUED | OUTPATIENT
Start: 2018-10-26 | End: 2018-10-26 | Stop reason: SDUPTHER

## 2018-10-26 RX ADMIN — INDOMETHACIN 100 MG: 50 SUPPOSITORY RECTAL at 11:02

## 2018-10-26 RX ADMIN — DIPHENHYDRAMINE HCL 25 MG: 25 TABLET ORAL at 22:15

## 2018-10-26 RX ADMIN — GABAPENTIN 300 MG: 300 CAPSULE ORAL at 20:47

## 2018-10-26 RX ADMIN — GLUCAGON HYDROCHLORIDE 0.25 MG: KIT at 12:35

## 2018-10-26 RX ADMIN — IOPAMIDOL 12 ML: 612 INJECTION, SOLUTION INTRATHECAL at 12:47

## 2018-10-26 RX ADMIN — DIPHENHYDRAMINE HCL 25 MG: 25 TABLET ORAL at 09:53

## 2018-10-26 RX ADMIN — POTASSIUM CHLORIDE 10 MEQ: 750 TABLET, EXTENDED RELEASE ORAL at 20:47

## 2018-10-26 RX ADMIN — DEXAMETHASONE SODIUM PHOSPHATE 10 MG: 4 INJECTION, SOLUTION INTRAMUSCULAR; INTRAVENOUS at 12:31

## 2018-10-26 RX ADMIN — METOPROLOL SUCCINATE 25 MG: 25 TABLET, EXTENDED RELEASE ORAL at 08:31

## 2018-10-26 RX ADMIN — MORPHINE SULFATE 2 MG: 2 INJECTION, SOLUTION INTRAMUSCULAR; INTRAVENOUS at 06:33

## 2018-10-26 RX ADMIN — GLYCOPYRROLATE 0.2 MG: 0.2 INJECTION, SOLUTION INTRAMUSCULAR; INTRAVENOUS at 12:31

## 2018-10-26 RX ADMIN — PANTOPRAZOLE SODIUM 40 MG: 40 TABLET, DELAYED RELEASE ORAL at 06:27

## 2018-10-26 RX ADMIN — POTASSIUM CHLORIDE 10 MEQ: 750 TABLET, EXTENDED RELEASE ORAL at 08:31

## 2018-10-26 RX ADMIN — ONDANSETRON HYDROCHLORIDE 4 MG: 2 INJECTION, SOLUTION INTRAMUSCULAR; INTRAVENOUS at 12:31

## 2018-10-26 RX ADMIN — PANTOPRAZOLE SODIUM 40 MG: 40 TABLET, DELAYED RELEASE ORAL at 15:45

## 2018-10-26 RX ADMIN — CIPROFLOXACIN 400 MG: 2 INJECTION, SOLUTION INTRAVENOUS at 11:02

## 2018-10-26 RX ADMIN — PROPOFOL 200 MG: 10 INJECTION, EMULSION INTRAVENOUS at 12:31

## 2018-10-26 RX ADMIN — LIDOCAINE HYDROCHLORIDE 40 MG: 20 INJECTION, SOLUTION INFILTRATION; PERINEURAL at 12:31

## 2018-10-26 RX ADMIN — LEVOTHYROXINE SODIUM 150 MCG: 75 TABLET ORAL at 06:27

## 2018-10-26 RX ADMIN — SODIUM CHLORIDE: 9 INJECTION, SOLUTION INTRAVENOUS at 12:28

## 2018-10-26 RX ADMIN — LEVETIRACETAM 500 MG: 500 TABLET ORAL at 20:47

## 2018-10-26 ASSESSMENT — PAIN SCALES - GENERAL
PAINLEVEL_OUTOF10: 1
PAINLEVEL_OUTOF10: 0
PAINLEVEL_OUTOF10: 6
PAINLEVEL_OUTOF10: 0
PAINLEVEL_OUTOF10: 0

## 2018-10-26 ASSESSMENT — PAIN DESCRIPTION - LOCATION: LOCATION: ABDOMEN

## 2018-10-26 ASSESSMENT — PAIN DESCRIPTION - FREQUENCY: FREQUENCY: CONTINUOUS

## 2018-10-26 ASSESSMENT — PAIN DESCRIPTION - ORIENTATION: ORIENTATION: MID;ANTERIOR

## 2018-10-26 ASSESSMENT — PAIN DESCRIPTION - ONSET: ONSET: ON-GOING

## 2018-10-26 ASSESSMENT — PAIN DESCRIPTION - PAIN TYPE: TYPE: ACUTE PAIN

## 2018-10-26 ASSESSMENT — PAIN DESCRIPTION - PROGRESSION: CLINICAL_PROGRESSION: GRADUALLY WORSENING

## 2018-10-26 ASSESSMENT — PAIN DESCRIPTION - DESCRIPTORS: DESCRIPTORS: ACHING;DISCOMFORT;DULL

## 2018-10-26 NOTE — PROGRESS NOTES
PROGRESS  NOTE --                                                          INTERNAL  MEDICINE                                                                              I  PERSONALLY SAW , EXAMINED, AND CARED R Caleb Tejada [address], 10/26/2018     LABS, XRAY ,CHART, AND MEDICATIONS  REVIEWED BY ME .        10/25/18-SUBJECTIVE: Ronny Jenkins is alert awake and cooperative; oriented ×3. Denies any chest pain dyspnea emesis. Tolerating diet. Daughter present through the exam. At 0400 hrs., patient's blood pressure 93 systolic. She felt fine , no reason for the above. No chest pain palpitations. She had no emesis. She is having pruritus, advise is most likely from the bilirubin. EKG from yesterday, sinus rhythm and essentially normal. Sodium 133 potassium 3.4 chloride 97 CO2 26 B1 20 creatinine 1.1 magnesium 2.1 lactic acid sepsis 0.8. Calcium 8.4 protein 5.9 albumin 2.9. Pro-calcitonin 0.13 phosphorus 4.2. . Alkaline phosphatase slightly better 988, a LT 68  bilirubin 6.6 direct 5.4. A1c 5.0 TSH 0.86. Hemoglobin 10.0 WBC 9.2 platelets 964. Urine culture negative blood cultures pending. 10/26/18-patient laying quietly in bed, scheduled for ERCP this afternoon. Her sister is present through the exam. Itching has improved slightly; denies any chest pain dyspnea nausea. Still had some abdominal discomfort after meals. EKG from yesterday reviewed, sinus bradycardia no other rhythm difficulties. She's had no palpitations. Hypotension was present yesterday much improved today. Labs pending. Blood cultures negative thus far. ROS:  Negative to a 10 system review except that mentioned in the HPI.           Objective:     PHYSICAL EXAM:    VS: /63   Pulse 62   Temp 97.8 °F (36.6 °C) (Oral)   Resp 12   Ht 5' 7\" (1.702 m)   Wt 243 lb 11.2 oz (110.5 kg)   SpO2 94%   BMI 38.17 kg/m²   General appearance: Alert, 10:09 AM     10/26/2018    TIME >25 MINUTES    >  50 %  OF  TIME  DISCUSSION     Active Hospital Problems    Diagnosis    Hypertension [I10]     Priority: High    Hypotension [I95.9]    Pruritus [L29.9]    Choledocholithiasis [K80.50]    Obstructive jaundice [K83.8]    Iron deficiency anemia [D50.9]    Persistent atrial fibrillation (HCC) [I48.1]    Chest pain [R07.9]    Seizure disorder (HCC) [G40.909]    Meningioma ([address] [D32.9]                 ------------  INFORMATION  -----------      DIET:Diet NPO, After Midnight Exceptions are: Sips with Meds        Allergies   Allergen Reactions    Pcn [Penicillins] Other (See Comments)     Unknown      Sulfa Antibiotics Swelling     Whole body and itching         MEDICATION SIDE EFFECTS:none       SCHEDULED MEDS:                                 Current Facility-Administered Medications   Medication Dose Route Frequency Provider Last Rate Last Dose    indomethacin (INDOCIN) 50 MG suppository 100 mg  100 mg Rectal See Admin Instructions Mirtha BARKER Sugar, APRN - CNP        ciprofloxacin (CIPRO) IVPB 400 mg  400 mg Intravenous See Admin Instructions Mirtha BARKER Sugar, APRN - CNP        potassium chloride (KLOR-CON M) extended release tablet 10 mEq  10 mEq Oral TID Judy Tam DO   10 mEq at 10/26/18 0831    cholestyramine (QUESTRAN) packet 4 g  1 packet Oral BID Judy Tam DO   4 g at 10/25/18 2051    diphenhydrAMINE (BENADRYL) tablet 25 mg  25 mg Oral Q8H PRN Judy Tam DO   25 mg at 10/26/18 0953    hydrocortisone 1 % cream   Topical TID PRN Staci Tam DO        sodium chloride (PF) 0.9 % injection 10 mL  10 mL Intravenous PRN JUSTINA Guzmán        FLUoxetine (PROZAC) capsule 20 mg  20 mg Oral QAM Ashkan Tam DO   20 mg at 10/25/18 0851    gabapentin (NEURONTIN) capsule 300 mg  300 mg Oral BID Judy Tam DO   300 mg at 10/25/18 2051    levETIRAcetam (KEPPRA) tablet 500 mg  500 mg Oral Nightly Ashkan A Mihok, DO   500 mg at 10/25/18     pantoprazole (PROTONIX) tablet 40 mg  40 mg Oral BID AC Ashkan A Mihok, DO   40 mg at 10/26/18 3761    metoprolol succinate (TOPROL XL) extended release tablet 25 mg  25 mg Oral QAM Ashkan A Mihok, DO   25 mg at 10/26/18 0831    metoclopramide (REGLAN) tablet 5 mg  5 mg Oral 4x Daily PRN Deb Cap Mihok, DO        levothyroxine (SYNTHROID) tablet 150 mcg  150 mcg Oral QAM Ashkan A Mihok, DO   150 mcg at 10/26/18 7868    0.9 % sodium chloride infusion   Intravenous Continuous Deb Cap Mihok,  mL/hr at 10/24/18 1709      morphine (PF) injection 2 mg  2 mg Intravenous Q2H PRN Deb Cap Mihok, DO   2 mg at 10/26/18 9825    Or    morphine (PF) injection 4 mg  4 mg Intravenous Q2H PRN Deb Cap Mihok, DO        acetaminophen (TYLENOL) tablet 650 mg  650 mg Oral Q4H PRN Deb Cap Mihok, DO        heparin (porcine) injection 5,000 Units  5,000 Units Subcutaneous Q8H Ashkan A Mihok, DO   Stopped at 10/25/18 1630     Scheduled Meds:   indomethacin  100 mg Rectal See Admin Instructions    ciprofloxacin  400 mg Intravenous See Admin Instructions    potassium chloride  10 mEq Oral TID    cholestyramine  1 packet Oral BID    FLUoxetine  20 mg Oral QAM    gabapentin  300 mg Oral BID    levETIRAcetam  500 mg Oral Nightly    pantoprazole  40 mg Oral BID AC    metoprolol succinate  25 mg Oral QAM    levothyroxine  150 mcg Oral QAM    heparin (porcine)  5,000 Units Subcutaneous Q8H       Continuous Infusions:   sodium chloride 100 mL/hr at 10/24/18 1709         Data:   Temperature:  Current - Temp: 97.8 °F (36.6 °C);  Max - Temp  Av.2 °F (36.8 °C)  Min: 97.8 °F (36.6 °C)  Max: 98.4 °F (36.9 °C)    Respiratory Rate : Resp  Av.5  Min: 12  Max: 16    Pulse Range: Pulse  Av.8  Min: 53  Max: 65    Blood Presuure Range:  Systolic (13XYW), SAZ:180 , Min:108 , FGX:880   ; Diastolic (21VTX), DNX:36, Min:54, Max:67      Pulse ox Range: SpO2  Av.5 %  Min: 94 %  Max: 95 %    Patient Vitals for the

## 2018-10-27 VITALS
TEMPERATURE: 98.2 F | WEIGHT: 245.7 LBS | OXYGEN SATURATION: 95 % | SYSTOLIC BLOOD PRESSURE: 132 MMHG | HEIGHT: 67 IN | RESPIRATION RATE: 18 BRPM | BODY MASS INDEX: 38.56 KG/M2 | HEART RATE: 57 BPM | DIASTOLIC BLOOD PRESSURE: 62 MMHG

## 2018-10-27 LAB
ALBUMIN SERPL-MCNC: 2.9 G/DL (ref 3.5–5.2)
ALP BLD-CCNC: 917 U/L (ref 35–104)
ALT SERPL-CCNC: 67 U/L (ref 0–32)
AMYLASE: 20 U/L (ref 20–100)
AST SERPL-CCNC: 74 U/L (ref 0–31)
BILIRUB SERPL-MCNC: 5.8 MG/DL (ref 0–1.2)
BILIRUBIN DIRECT: 4.4 MG/DL (ref 0–0.3)
BILIRUBIN, INDIRECT: 1.4 MG/DL (ref 0–1)
HCT VFR BLD CALC: 30.9 % (ref 34–48)
HEMOGLOBIN: 9.8 G/DL (ref 11.5–15.5)
LIPASE: 15 U/L (ref 13–60)
MCH RBC QN AUTO: 28 PG (ref 26–35)
MCHC RBC AUTO-ENTMCNC: 31.7 % (ref 32–34.5)
MCV RBC AUTO: 88.3 FL (ref 80–99.9)
PDW BLD-RTO: 17.3 FL (ref 11.5–15)
PLATELET # BLD: 284 E9/L (ref 130–450)
PMV BLD AUTO: 13.4 FL (ref 7–12)
RBC # BLD: 3.5 E12/L (ref 3.5–5.5)
TOTAL PROTEIN: 6.1 G/DL (ref 6.4–8.3)
WBC # BLD: 5.6 E9/L (ref 4.5–11.5)

## 2018-10-27 PROCEDURE — 6370000000 HC RX 637 (ALT 250 FOR IP): Performed by: INTERNAL MEDICINE

## 2018-10-27 PROCEDURE — 6360000002 HC RX W HCPCS: Performed by: INTERNAL MEDICINE

## 2018-10-27 PROCEDURE — 36415 COLL VENOUS BLD VENIPUNCTURE: CPT

## 2018-10-27 PROCEDURE — 0FC98ZZ EXTIRPATION OF MATTER FROM COMMON BILE DUCT, VIA NATURAL OR ARTIFICIAL OPENING ENDOSCOPIC: ICD-10-PCS | Performed by: INTERNAL MEDICINE

## 2018-10-27 PROCEDURE — 83690 ASSAY OF LIPASE: CPT

## 2018-10-27 PROCEDURE — 80076 HEPATIC FUNCTION PANEL: CPT

## 2018-10-27 PROCEDURE — BF131ZZ FLUOROSCOPY OF GALLBLADDER AND BILE DUCTS USING LOW OSMOLAR CONTRAST: ICD-10-PCS | Performed by: INTERNAL MEDICINE

## 2018-10-27 PROCEDURE — 85027 COMPLETE CBC AUTOMATED: CPT

## 2018-10-27 PROCEDURE — 82150 ASSAY OF AMYLASE: CPT

## 2018-10-27 RX ORDER — CHOLESTYRAMINE 4 G/9G
1 POWDER, FOR SUSPENSION ORAL 2 TIMES DAILY
Qty: 14 PACKET | Refills: 0 | Status: SHIPPED | OUTPATIENT
Start: 2018-10-27 | End: 2021-05-26

## 2018-10-27 RX ORDER — DIPHENHYDRAMINE HCL 25 MG
25 TABLET ORAL EVERY 8 HOURS PRN
Qty: 30 TABLET | Refills: 0 | Status: SHIPPED | OUTPATIENT
Start: 2018-10-27 | End: 2018-11-26

## 2018-10-27 RX ORDER — POTASSIUM CHLORIDE 750 MG/1
10 TABLET, EXTENDED RELEASE ORAL 2 TIMES DAILY
Qty: 60 TABLET | Refills: 3 | Status: SHIPPED | OUTPATIENT
Start: 2018-10-27 | End: 2020-12-07 | Stop reason: CLARIF

## 2018-10-27 RX ADMIN — FLUOXETINE HYDROCHLORIDE 20 MG: 20 CAPSULE ORAL at 09:04

## 2018-10-27 RX ADMIN — DIPHENHYDRAMINE HCL 25 MG: 25 TABLET ORAL at 06:16

## 2018-10-27 RX ADMIN — POTASSIUM CHLORIDE 10 MEQ: 750 TABLET, EXTENDED RELEASE ORAL at 09:04

## 2018-10-27 RX ADMIN — GABAPENTIN 300 MG: 300 CAPSULE ORAL at 09:03

## 2018-10-27 RX ADMIN — HEPARIN SODIUM 5000 UNITS: 10000 INJECTION, SOLUTION INTRAVENOUS; SUBCUTANEOUS at 00:28

## 2018-10-27 RX ADMIN — PANTOPRAZOLE SODIUM 40 MG: 40 TABLET, DELAYED RELEASE ORAL at 06:16

## 2018-10-27 RX ADMIN — HEPARIN SODIUM 5000 UNITS: 10000 INJECTION, SOLUTION INTRAVENOUS; SUBCUTANEOUS at 09:04

## 2018-10-27 RX ADMIN — LEVOTHYROXINE SODIUM 150 MCG: 75 TABLET ORAL at 06:16

## 2018-10-27 RX ADMIN — METOPROLOL SUCCINATE 25 MG: 25 TABLET, EXTENDED RELEASE ORAL at 09:03

## 2018-10-27 ASSESSMENT — PAIN SCALES - GENERAL: PAINLEVEL_OUTOF10: 0

## 2018-10-27 NOTE — DISCHARGE SUMMARY
utilized. In brief, contrast material was injected into the common bile duct with reflux into the intrahepatic biliary tree. There is delayed passage of contrast from the common bile duct into the small bowel. There are multiple radiolucent filling defects within the distal common bile duct, consistent with gallbladder calculi versus foci of gas. Please refer to the operative report for further details. Intraoperative fluoroscopy, as above. report for details. Discharge Exam:  See progress note from today    Current Discharge Medication List      START taking these medications    Details   potassium chloride (KLOR-CON M) 10 MEQ extended release tablet Take 1 tablet by mouth 2 times daily  Qty: 60 tablet, Refills: 3      cholestyramine (QUESTRAN) 4 g packet Take 1 packet by mouth 2 times daily for 7 days Itching  Qty: 14 packet, Refills: 0      diphenhydrAMINE (BENADRYL) 25 MG tablet Take 1 tablet by mouth every 8 hours as needed for Itching  Qty: 30 tablet, Refills: 0         CONTINUE these medications which have NOT CHANGED    Details   ursodiol (ACTIGALL) 300 MG capsule Take 1 capsule by mouth every morning  Refills: 0      levothyroxine (SYNTHROID) 150 MCG tablet Take 150 mcg by mouth every morning      apixaban (ELIQUIS) 5 MG TABS tablet Take 1 tablet by mouth 2 times daily Resume medication in 48 hours  Qty: 2 tablet, Refills: 0      acetaminophen (TYLENOL) 325 MG tablet Take 650 mg by mouth every 6 hours as needed for Pain      gabapentin (NEURONTIN) 300 MG capsule Take 300 mg by mouth 2 times daily. Darrel Blackmon       FLUoxetine (PROZAC) 20 MG capsule Take 20 mg by mouth every morning       omeprazole (PRILOSEC) 20 MG delayed release capsule Take 20 mg by mouth daily as needed       metoprolol succinate (TOPROL XL) 25 MG extended release tablet Take 1 tablet by mouth daily  Qty: 30 tablet, Refills: 3      metoclopramide (REGLAN) 5 MG tablet Take 1 tablet by mouth 3 times daily (before meals)  Qty: 120 tablet,

## 2018-10-27 NOTE — PROGRESS NOTES
(BENADRYL) tablet 25 mg  25 mg Oral Q8H PRN Susanne Pallas Mihok, DO   25 mg at 10/27/18 0616    hydrocortisone 1 % cream   Topical TID PRN Susanne Pallas Mihok, DO        sodium chloride (PF) 0.9 % injection 10 mL  10 mL Intravenous PRN JUSTINA Trevino        FLUoxetine (PROZAC) capsule 20 mg  20 mg Oral QAM Ashkan A Mihok, DO   20 mg at 10/27/18 0987    gabapentin (NEURONTIN) capsule 300 mg  300 mg Oral BID Rhianna Remymichelle Dysonhok, DO   300 mg at 10/27/18 3751    levETIRAcetam (KEPPRA) tablet 500 mg  500 mg Oral Nightly Susanne Pallas Mihok, DO   500 mg at 10/26/18 2047    pantoprazole (PROTONIX) tablet 40 mg  40 mg Oral BID AC Ashkan A Miravink, DO   40 mg at 10/27/18 0966    metoprolol succinate (TOPROL XL) extended release tablet 25 mg  25 mg Oral QAM Ashkan Castañedak, DO   25 mg at 10/27/18 5437    metoclopramide (REGLAN) tablet 5 mg  5 mg Oral 4x Daily PRN Susanne Pallas Mihok, DO        levothyroxine (SYNTHROID) tablet 150 mcg  150 mcg Oral QAM Ashkan A Maddyk, DO   150 mcg at 10/27/18 0616    0.9 % sodium chloride infusion   Intravenous Continuous Ashkan MJ Tam,  mL/hr at 10/24/18 1709      morphine (PF) injection 2 mg  2 mg Intravenous Q2H PRN Susanne Pallas Mihok, DO   2 mg at 10/26/18 7180    Or    morphine (PF) injection 4 mg  4 mg Intravenous Q2H PRN Susanne Pallas Mihok, DO        acetaminophen (TYLENOL) tablet 650 mg  650 mg Oral Q4H PRN Susanne Pallas Mihok, DO        heparin (porcine) injection 5,000 Units  5,000 Units Subcutaneous Q8H Ashkan A Mihok, DO   5,000 Units at 10/27/18 9812     Scheduled Meds:   potassium chloride  10 mEq Oral TID    cholestyramine  1 packet Oral BID    FLUoxetine  20 mg Oral QAM    gabapentin  300 mg Oral BID    levETIRAcetam  500 mg Oral Nightly    pantoprazole  40 mg Oral BID AC    metoprolol succinate  25 mg Oral QAM    levothyroxine  150 mcg Oral QAM    heparin (porcine)  5,000 Units Subcutaneous Q8H       Continuous Infusions:   sodium chloride 100 mL/hr at 10/24/18 6461         Data: Temperature:  Current - Temp: 97.7 °F (36.5 °C); Max - Temp  Av.7 °F (36.5 °C)  Min: 97.5 °F (36.4 °C)  Max: 98.1 °F (36.7 °C)    Respiratory Rate : Resp  Av.6  Min: 16  Max: 22    Pulse Range: Pulse  Av  Min: 56  Max: 74    Blood Presuure Range:  Systolic (80DWV), ANDREW:928 , Min:142 , ORE:797   ; Diastolic (57ATR), PMA:25, Min:64, Max:90      Pulse ox Range: SpO2  Av.1 %  Min: 87 %  Max: 100 %    Patient Vitals for the past 8 hrs:   BP Temp Temp src Pulse Resp SpO2 Weight   10/27/18 0815 (!) 145/64 97.7 °F (36.5 °C) Oral 57 16 95 % -   10/27/18 0259 - - - - - - 245 lb 11.2 oz (111.4 kg)         Intake/Output Summary (Last 24 hours) at 10/27/18 0945  Last data filed at 10/26/18 1240   Gross per 24 hour   Intake              100 ml   Output                0 ml   Net              100 ml       I/O last 3 completed shifts: In: 100 [I.V.:100]  Out: 0     No intake/output data recorded.     Wt Readings from Last 3 Encounters:   10/27/18 245 lb 11.2 oz (111.4 kg)   10/11/18 239 lb (108.4 kg)   10/09/18 239 lb (108.4 kg)       Labs:   CBC:   Lab Results   Component Value Date    WBC 5.6 10/27/2018    RBC 3.50 10/27/2018    HGB 9.8 10/27/2018    HCT 30.9 10/27/2018    MCV 88.3 10/27/2018    MCH 28.0 10/27/2018    MCHC 31.7 10/27/2018    RDW 17.3 10/27/2018     10/27/2018    MPV 13.4 10/27/2018     CBC with Differential:    Lab Results   Component Value Date    WBC 5.6 10/27/2018    RBC 3.50 10/27/2018    HGB 9.8 10/27/2018    HCT 30.9 10/27/2018     10/27/2018    MCV 88.3 10/27/2018    MCH 28.0 10/27/2018    MCHC 31.7 10/27/2018    RDW 17.3 10/27/2018    BANDSPCT 1 2016    LYMPHOPCT 11.0 10/25/2018    MONOPCT 7.9 10/25/2018    BASOPCT 0.4 10/25/2018    MONOSABS 0.73 10/25/2018    LYMPHSABS 1.01 10/25/2018    EOSABS 0.07 10/25/2018    BASOSABS 0.04 10/25/2018     Hemoglobin/Hematocrit:    Lab Results   Component Value Date    HGB 9.8 10/27/2018    HCT 30.9 10/27/2018     CMP:    Lab Results   Component Value Date     10/26/2018    K 3.6 10/26/2018     10/26/2018    CO2 27 10/26/2018    BUN 15 10/26/2018    CREATININE 0.9 10/26/2018    GFRAA >60 10/26/2018    LABGLOM >60 10/26/2018    GLUCOSE 90 10/26/2018    GLUCOSE 94 02/25/2012    PROT 6.1 10/27/2018    LABALBU 2.9 10/27/2018    LABALBU 3.9 02/25/2012    CALCIUM 8.3 10/26/2018    BILITOT 5.8 10/27/2018    ALKPHOS 917 10/27/2018    AST 74 10/27/2018    ALT 67 10/27/2018     BMP:    Lab Results   Component Value Date     10/26/2018    K 3.6 10/26/2018     10/26/2018    CO2 27 10/26/2018    BUN 15 10/26/2018    LABALBU 2.9 10/27/2018    LABALBU 3.9 02/25/2012    CREATININE 0.9 10/26/2018    CALCIUM 8.3 10/26/2018    GFRAA >60 10/26/2018    LABGLOM >60 10/26/2018    GLUCOSE 90 10/26/2018    GLUCOSE 94 02/25/2012     Hepatic Function Panel:    Lab Results   Component Value Date    ALKPHOS 917 10/27/2018    ALT 67 10/27/2018    AST 74 10/27/2018    PROT 6.1 10/27/2018    BILITOT 5.8 10/27/2018    BILIDIR 4.4 10/27/2018    IBILI 1.4 10/27/2018    LABALBU 2.9 10/27/2018    LABALBU 3.9 02/25/2012     Magnesium:    Lab Results   Component Value Date    MG 2.1 10/25/2018     Phosphorus:    Lab Results   Component Value Date    PHOS 3.2 10/26/2018     Uric Acid:    Lab Results   Component Value Date    LABURIC 4.6 10/25/2018     PT/INR:    Lab Results   Component Value Date    PROTIME 12.7 10/26/2018    INR 1.1 10/26/2018     Warfarin PT/INR:  No components found for: PTPATWAR, PTINRWAR  PTT:    Lab Results   Component Value Date    APTT 33.2 10/26/2018   [APTT}  Troponin:    Lab Results   Component Value Date    TROPONINI <0.01 04/10/2018     Last 3 Troponin:    Lab Results   Component Value Date    TROPONINI <0.01 04/10/2018    TROPONINI <0.01 04/10/2018    TROPONINI <0.01 04/09/2018     U/A:    Lab Results   Component Value Date    COLORU Yellow 10/24/2018    PROTEINU Negative 10/24/2018    PHUR 7.0 10/24/2018    WBCUA 2-5

## 2018-10-27 NOTE — PROGRESS NOTES
Date    IRON 35 03/08/2017     Iron Saturation:  No components found for: PERCENTFE  FERRITIN:    Lab Results   Component Value Date    FERRITIN 14 03/08/2017         Assessment:     Principal Problem:    Choledocholithiasis  Active Problems:  · Choledocholithiasis - resolved  · Jaundice, secondary to above, improving  · Elevated lfts  · Abdominal pain - resolved  · Pruritus, likely 2/2 to hyperbilirubinemia - improved  · Anemia, normocytic, normochromic  · Afib  · Seizure disorder  · S/P Lap jeremie 10/11/18  · S/P ERCP with papillotomy and stones removal 10/26/18 - CBD dilated with 3 large stones. Papillotomy was done and 3 large stones were removed    Plan:     · Advance diet to low fat. · Discharge per PCP, ok from gi pov with outpatient follow up as d/w pt who verbalized understanding. Pt to have labs drawn prior to appointment. Script in blue chart.     Discussed with Dr. Elver Phan per Dr. Juan Luis Daniels [id number], FNP-BC 10/27/2018 11:05 AM For Dr. Sang Webster

## 2018-10-29 LAB
BLOOD CULTURE, ROUTINE: NORMAL
CULTURE, BLOOD 2: NORMAL

## 2018-11-02 ENCOUNTER — HOSPITAL ENCOUNTER (OUTPATIENT)
Age: 73
Discharge: HOME | End: 2018-11-04
Payer: COMMERCIAL

## 2018-11-02 LAB
ALBUMIN SERPL-MCNC: 3.2 G/DL (ref 3.5–5.2)
ALP BLD-CCNC: 1024 U/L (ref 35–104)
ALT SERPL-CCNC: 74 U/L (ref 0–32)
AST SERPL-CCNC: 72 U/L (ref 0–31)
BILIRUB SERPL-MCNC: 2.3 MG/DL (ref 0–1.2)
BILIRUBIN DIRECT: 1.4 MG/DL (ref 0–0.3)
BILIRUBIN, INDIRECT: 0.9 MG/DL (ref 0–1)
CHOLESTEROL, TOTAL: 261 MG/DL (ref 0–199)
HDLC SERPL-MCNC: 46 MG/DL
LDL CHOLESTEROL CALCULATED: 186 MG/DL (ref 0–99)
TOTAL PROTEIN: 6.2 G/DL (ref 6.4–8.3)
TRIGL SERPL-MCNC: 147 MG/DL (ref 0–149)
VLDLC SERPL CALC-MCNC: 29 MG/DL

## 2018-11-02 PROCEDURE — 80076 HEPATIC FUNCTION PANEL: CPT

## 2018-11-02 PROCEDURE — 80061 LIPID PANEL: CPT

## 2018-11-12 ENCOUNTER — HOSPITAL ENCOUNTER (OUTPATIENT)
Age: 73
Discharge: HOME | End: 2018-11-12
Payer: COMMERCIAL

## 2018-11-12 LAB
ALBUMIN SERPL-MCNC: 3.4 G/DL (ref 3.5–5.2)
ALP BLD-CCNC: 745 U/L (ref 35–104)
ALT SERPL-CCNC: 53 U/L (ref 0–32)
AMYLASE: 34 U/L (ref 20–100)
ANION GAP SERPL CALCULATED.3IONS-SCNC: 10 MMOL/L (ref 7–16)
AST SERPL-CCNC: 57 U/L (ref 0–31)
BASOPHILS ABSOLUTE: 0.04 E9/L (ref 0–0.2)
BASOPHILS RELATIVE PERCENT: 0.7 % (ref 0–2)
BILIRUB SERPL-MCNC: 1.2 MG/DL (ref 0–1.2)
BUN BLDV-MCNC: 18 MG/DL (ref 8–23)
CALCIUM SERPL-MCNC: 8.6 MG/DL (ref 8.6–10.2)
CHLORIDE BLD-SCNC: 102 MMOL/L (ref 98–107)
CO2: 28 MMOL/L (ref 22–29)
CREAT SERPL-MCNC: 0.9 MG/DL (ref 0.5–1)
EOSINOPHILS ABSOLUTE: 0.22 E9/L (ref 0.05–0.5)
EOSINOPHILS RELATIVE PERCENT: 3.8 % (ref 0–6)
GFR AFRICAN AMERICAN: >60
GFR NON-AFRICAN AMERICAN: >60 ML/MIN/1.73
GLUCOSE BLD-MCNC: 94 MG/DL (ref 74–99)
HCT VFR BLD CALC: 35.9 % (ref 34–48)
HEMOGLOBIN: 11.2 G/DL (ref 11.5–15.5)
IMMATURE GRANULOCYTES #: 0.02 E9/L
IMMATURE GRANULOCYTES %: 0.3 % (ref 0–5)
LIPASE: 25 U/L (ref 13–60)
LYMPHOCYTES ABSOLUTE: 1.52 E9/L (ref 1.5–4)
LYMPHOCYTES RELATIVE PERCENT: 26 % (ref 20–42)
MCH RBC QN AUTO: 28.4 PG (ref 26–35)
MCHC RBC AUTO-ENTMCNC: 31.2 % (ref 32–34.5)
MCV RBC AUTO: 90.9 FL (ref 80–99.9)
MONOCYTES ABSOLUTE: 0.55 E9/L (ref 0.1–0.95)
MONOCYTES RELATIVE PERCENT: 9.4 % (ref 2–12)
NEUTROPHILS ABSOLUTE: 3.5 E9/L (ref 1.8–7.3)
NEUTROPHILS RELATIVE PERCENT: 59.8 % (ref 43–80)
PDW BLD-RTO: 16.5 FL (ref 11.5–15)
PLATELET # BLD: 195 E9/L (ref 130–450)
PMV BLD AUTO: 13.5 FL (ref 7–12)
POTASSIUM SERPL-SCNC: 3.8 MMOL/L (ref 3.5–5)
RBC # BLD: 3.95 E12/L (ref 3.5–5.5)
SODIUM BLD-SCNC: 140 MMOL/L (ref 132–146)
TOTAL PROTEIN: 6.1 G/DL (ref 6.4–8.3)
WBC # BLD: 5.9 E9/L (ref 4.5–11.5)

## 2018-11-12 PROCEDURE — 85025 COMPLETE CBC W/AUTO DIFF WBC: CPT

## 2018-11-12 PROCEDURE — 82150 ASSAY OF AMYLASE: CPT

## 2018-11-12 PROCEDURE — 80053 COMPREHEN METABOLIC PANEL: CPT

## 2018-11-12 PROCEDURE — 83690 ASSAY OF LIPASE: CPT

## 2018-11-12 PROCEDURE — 36415 COLL VENOUS BLD VENIPUNCTURE: CPT

## 2019-11-04 ENCOUNTER — HOSPITAL ENCOUNTER (OUTPATIENT)
Age: 74
Discharge: HOME | End: 2019-11-04
Payer: COMMERCIAL

## 2019-11-04 ENCOUNTER — HOSPITAL ENCOUNTER (OUTPATIENT)
Dept: MRI IMAGING | Age: 74
Discharge: HOME | End: 2019-11-06
Payer: COMMERCIAL

## 2019-11-04 DIAGNOSIS — D32.9 MENINGIOMA (HCC): ICD-10-CM

## 2019-11-04 LAB
ANION GAP SERPL CALCULATED.3IONS-SCNC: 9 MMOL/L (ref 7–16)
BUN BLDV-MCNC: 17 MG/DL (ref 8–23)
CALCIUM SERPL-MCNC: 8.7 MG/DL (ref 8.6–10.2)
CHLORIDE BLD-SCNC: 99 MMOL/L (ref 98–107)
CO2: 30 MMOL/L (ref 22–29)
CREAT SERPL-MCNC: 1 MG/DL (ref 0.5–1)
GFR AFRICAN AMERICAN: >60
GFR NON-AFRICAN AMERICAN: 54 ML/MIN/1.73
GLUCOSE BLD-MCNC: 85 MG/DL (ref 74–99)
POTASSIUM SERPL-SCNC: 3.5 MMOL/L (ref 3.5–5)
SODIUM BLD-SCNC: 138 MMOL/L (ref 132–146)

## 2019-11-04 PROCEDURE — 70553 MRI BRAIN STEM W/O & W/DYE: CPT

## 2019-11-04 PROCEDURE — 80048 BASIC METABOLIC PNL TOTAL CA: CPT

## 2019-11-04 PROCEDURE — 36415 COLL VENOUS BLD VENIPUNCTURE: CPT

## 2019-11-04 PROCEDURE — A9577 INJ MULTIHANCE: HCPCS | Performed by: RADIOLOGY

## 2019-11-04 PROCEDURE — 6360000004 HC RX CONTRAST MEDICATION: Performed by: RADIOLOGY

## 2019-11-04 RX ADMIN — GADOBENATE DIMEGLUMINE 20 ML: 529 INJECTION, SOLUTION INTRAVENOUS at 10:50

## 2019-12-06 ENCOUNTER — OFFICE VISIT (OUTPATIENT)
Dept: CARDIOLOGY CLINIC | Age: 74
End: 2019-12-06
Payer: COMMERCIAL

## 2019-12-06 VITALS
HEART RATE: 51 BPM | SYSTOLIC BLOOD PRESSURE: 134 MMHG | WEIGHT: 250 LBS | DIASTOLIC BLOOD PRESSURE: 80 MMHG | HEIGHT: 66 IN | RESPIRATION RATE: 16 BRPM | BODY MASS INDEX: 40.18 KG/M2

## 2019-12-06 DIAGNOSIS — I48.19 PERSISTENT ATRIAL FIBRILLATION (HCC): Primary | ICD-10-CM

## 2019-12-06 PROCEDURE — 99213 OFFICE O/P EST LOW 20 MIN: CPT | Performed by: INTERNAL MEDICINE

## 2019-12-06 PROCEDURE — 93000 ELECTROCARDIOGRAM COMPLETE: CPT | Performed by: INTERNAL MEDICINE

## 2019-12-06 RX ORDER — PRAVASTATIN SODIUM 40 MG
TABLET ORAL
Refills: 0 | COMMUNITY
Start: 2019-09-24

## 2020-07-24 ENCOUNTER — TELEPHONE (OUTPATIENT)
Dept: PHARMACY | Facility: CLINIC | Age: 75
End: 2020-07-24

## 2020-07-24 NOTE — LETTER
[address]  [address], [address]        [address]   [address] [facility]           07/28/20     Dear Prem [address],    We tried to reach you recently regarding your pravastatin 40mg daily, but were unable to reach you on the telephone. It appears that this medication has not been filled at proper times. We are concerned you may be missing doses or not taking it as directed. It is important that you take your medications regularly and try not to miss a single dose. If you are no longer taking, or are taking differently, please call us at the number below so that we can discuss this and update your medication profile.     Some ways to help you remember to take and refill your medications are to:  · Use a pill box, set an alarm, and/or keep your medication near something that you do every day  · Ask your pharmacy if they participate in [facility]", a program where you can  all of your medications on the same day each month  · Ask your pharmacy if you can be set up with automatic refill, where they will automatically refill your prescription when it is due and let you know it's ready to     Sincerely,   Belén Vargas, PharmD, [address]  Phone: [phone], option 7

## 2020-07-24 NOTE — LETTER
[address] REQUEST  Prescriber:  MD Awilda Mcnally [address] / [place] [id number]  Phone: [phone]       Fax: [phone]     Patient:  Kamran Ribera   1945  [address] [facility]  [phone] (home)      Rationale:   Patient was identified as being non-adherent to pravastatin. Unable to reach patient to review, but does appear she is in need of rx's for pravastatin and losartan-HCTZ when her next refills are due in October. 90 day prescription(s):    Medication: pravastatin 40mg                         Si tab po daily             #: 90  R: 1  Medication: losartan-HCTZ 100-25mg            Si tab po daily             #: 90  R: 1       Prescriber Response:    Prescription(s) approved (please [place] one):  YES  NO    Other response: ________________________________________      ______________________________________   __________________  Authorized By       Date     Pharmacy: [facility]                                               Phone:     [phone]     [address] [place] [address]  Fax:     [phone]    [address] [place] [address]      The information transmitted is intended only for the person or entity to which it is addressed and may contain confidential and/or privileged material. Any review, retransmission, dissemination or other use of, or taking of any action in reliance upon, this information by persons or entities other than the intended recipient is prohibited. This document contains information covered under the Privacy Act, 5 (a), and/or the [employer] and Accountability Act ([address]) and its various implementing regulations and must be protected in accordance with those provisions. If you received this in error, please contact the sender and delete the material from any computer.

## 2020-07-24 NOTE — TELEPHONE ENCOUNTER
CLINICAL PHARMACY: ADHERENCE REVIEW  Identified care gap per [employer]; fills at [facility]: ACE/ARB and Statin adherence    Last Office Visit: unable to determine in this EMR; prescriber Jeni Valdes MD) is [facility]    ASSESSMENT  ACE/ARB ADHERENCE  [facility]: 91% YTD    Per [facility]   Losartan-HCTZ 100-25mg daily last filled on 7/8/20 for a 90 day supply; 0 refills remaining. (prev fills: #90 1/6/20 & 4/22/20)    BP Readings from Last 3 Encounters:   12/06/19 134/80   10/27/18 132/62   10/26/18 (!) 184/88     CrCl cannot be calculated (Patient's most recent lab result is older than the maximum 120 days allowed. ). STATIN ADHERENCE  PDC: 1st fill YTD per [employer] report as of 7/5/20    Per [facility]   Pravastatin 40mg daily last filled on 7/8/20 for a 90 day supply; 0 refills remaining. (prev fill: #90 3/7/20)    Lab Results   Component Value Date    CHOL 261 (H) 11/02/2018    TRIG 147 11/02/2018    HDL 46 11/02/2018    LDLCALC 186 (H) 11/02/2018     ALT   Date Value Ref Range Status   11/12/2018 53 (H) 0 - 32 U/L Final     AST   Date Value Ref Range Status   11/12/2018 57 (H) 0 - 31 U/L Final     The 10-year ASCVD risk score (Awilda Baker et al., 2013) is: 23.3%    Values used to calculate the score:      Age: 76 years      Sex: Female      Is Non- : No      Diabetic: No      Tobacco smoker: No      Systolic Blood Pressure: 625 mmHg      Is BP treated: Yes      HDL Cholesterol: 46 mg/dL      Total Cholesterol: 261 mg/dL       PLAN  Attempting to reach patient to review.  Left message asking for return call.   · Pravastatin adh? (has current supply, but @1 month late to fill)    Cindy Real, PharmD, [address]  Direct: [phone]  Department, toll free: [phone], option 7

## 2020-07-28 NOTE — TELEPHONE ENCOUNTER
Attempting again to reach patient. Left another message and will send letter. See fax/letter to PCP re losartan-HCTZ and pravastatin refill requests.    =======================================================   For Pharmacy Admin Tracking Only    PHSO: Yes  Total # of Interventions Recommended: 1  - New Order #: 0 New Medication Order Reason(s):  Adherence  - Refills Provided #: 2  - Maintenance Safety Lab Monitoring #: 1  - New Therapy Lab Monitoring #: 0  Recommended intervention potential cost savings: 0  Total Interventions Accepted: 0  Time Spent (min): 15

## 2020-08-22 NOTE — PATIENT CARE CONFERENCE
P Quality Flow/Interdisciplinary Rounds Progress Note        Quality Flow Rounds held on October 25, 2018    Disciplines Attending:      Thea Ortizfidencio was admitted on 10/24/2018 10:08 AM    Anticipated Discharge Date:  Expected Discharge Date: 10/25/18    Disposition:    Johnny Score:  Johnny Scale Score: 21    Readmission Risk              Risk of Unplanned Readmission:        14           Discussed patient goal for the day, patient clinical progression, and barriers to discharge.   The following Goal(s) of the Day/Commitment(s) have been identified:  Increase BP and discharge planning      [place]  October 25, 2018 Patient would like to discontinue trazodone as he does not feel the medication is helpful. Explained we could increase the dose which may be more effective for his sleep, but he declined. Patient emphasizes he does not want any more medications.  -Encourage journaling/writing down his thoughts prior to bed to help alleviate some of his racing thoughts at night  -Recommended guided meditation. Baltazar already listens to \"relaxing music\" but advised to seek \"guided meditation\" on youtube the voice/guide can help patient remain in the present moment

## 2020-09-02 ENCOUNTER — TELEPHONE (OUTPATIENT)
Dept: PHARMACY | Facility: CLINIC | Age: 75
End: 2020-09-02

## 2020-12-07 ENCOUNTER — TELEPHONE (OUTPATIENT)
Dept: CARDIOLOGY CLINIC | Age: 75
End: 2020-12-07

## 2020-12-07 ENCOUNTER — OFFICE VISIT (OUTPATIENT)
Dept: CARDIOLOGY CLINIC | Age: 75
End: 2020-12-07
Payer: COMMERCIAL

## 2020-12-07 VITALS
HEART RATE: 65 BPM | DIASTOLIC BLOOD PRESSURE: 74 MMHG | BODY MASS INDEX: 40.98 KG/M2 | RESPIRATION RATE: 16 BRPM | SYSTOLIC BLOOD PRESSURE: 124 MMHG | WEIGHT: 255 LBS | HEIGHT: 66 IN

## 2020-12-07 PROCEDURE — 93000 ELECTROCARDIOGRAM COMPLETE: CPT | Performed by: INTERNAL MEDICINE

## 2020-12-07 PROCEDURE — 99213 OFFICE O/P EST LOW 20 MIN: CPT | Performed by: INTERNAL MEDICINE

## 2020-12-07 RX ORDER — BUPROPION HYDROCHLORIDE 300 MG/1
150 TABLET ORAL
COMMUNITY
Start: 2020-11-06

## 2020-12-07 NOTE — PROGRESS NOTES
days.. 90 capsule 3    cholestyramine (QUESTRAN) 4 g packet Take 1 packet by mouth 2 times daily for 7 days Itching 14 packet 0    ursodiol (ACTIGALL) 300 MG capsule Take 1 capsule by mouth every morning  0    metoclopramide (REGLAN) 5 MG tablet Take 1 tablet by mouth 3 times daily (before meals) (Patient not taking: Reported on 2019) 120 tablet 3    levETIRAcetam (KEPPRA) 500 MG tablet Take 1 tablet by mouth 2 times daily (Patient not taking: Reported on 2019) 90 tablet 2     No current facility-administered medications for this visit.          Allergies   Allergen Reactions    Pcn [Penicillins] Other (See Comments)     Unknown      Sulfa Antibiotics Swelling     Whole body and itching       Vitals:    20 1019   BP: 124/74   Pulse: 65   Resp: 16   Weight: 255 lb (115.7 kg)   Height: 5' 6\" (1.676 m)       Social History     Socioeconomic History    Marital status:      Spouse name: Not on file    Number of children: Not on file    Years of education: Not on file    Highest education level: Not on file   Occupational History    Not on file   Social Needs    Financial resource strain: Not on file    Food insecurity     Worry: Not on file     Inability: Not on file    Transportation needs     Medical: Not on file     Non-medical: Not on file   Tobacco Use    Smoking status: Former Smoker     Packs/day: 2.00     Types: Cigarettes     Start date: 10/24/1957     Last attempt to quit:      Years since quittin.9    Smokeless tobacco: Never Used   Substance and Sexual Activity    Alcohol use: Yes     Comment: rare    Drug use: No    Sexual activity: Not Currently   Lifestyle    Physical activity     Days per week: Not on file     Minutes per session: Not on file    Stress: Not on file   Relationships    Social connections     Talks on phone: Not on file     Gets together: Not on file     Attends [employer] service: Not on file     Active member of club or organization: Not on file     Attends meetings of clubs or organizations: Not on file     Relationship status: Not on file    Intimate partner violence     Fear of current or ex partner: Not on file     Emotionally abused: Not on file     Physically abused: Not on file     Forced sexual activity: Not on file   Other Topics Concern    Not on file   Social History Narrative    Not on file       Family History   Problem Relation Age of Onset    Heart Disease Father          age 80   Staci Evans Arrhythmia Mother         a fib    Diabetes Mother          age 80 of heart complications    Cancer Sister         BREAST         SUBJECTIVE: Keyanna Snyder presents to the office today for re-evaluation of cardiac diagnoses She complains of rare palpitations that can last a few minutes and goes away without intervention. She denies   chest pain, orthopnea, paroxysmal nocturnal dyspnea and syncope. No drop in functional capacity. Does not know what pills she is on apart from Eliquis, one of them was halved by PCP and she does not know why. Review of Systems:   Heart: as above   Lungs: as above   Eyes: denies changes in vision or discharge. Ears: denies changes in hearing or pain. Nose: denies epistaxis or masses   Throat: denies sore throat or trouble swallowing. Neuro: denies numbness, tingling, tremors. Skin: denies rashes or itching. : denies hematuria, dysuria   GI: denies vomiting, diarrhea   Psych: denies mood changed, anxiety, depression. all others negative. Physical Exam   /74   Pulse 65   Resp 16   Ht 5' 6\" (1.676 m)   Wt 255 lb (115.7 kg)   BMI 41.16 kg/m²   Constitutional: Oriented to person, place, and time. obese. No distress. Head: Normocephalic and atraumatic. Eyes: EOM are normal. Pupils are equal, round, and reactive to light. Neck: Normal range of motion. Neck supple. No hepatojugular reflux and no JVD present. Carotid bruit is not present. No tracheal deviation present.  No

## 2020-12-07 NOTE — TELEPHONE ENCOUNTER
Patient called in and stated she is taking Metoprolol Succ 25 mg only 1/2 pill qd. She stated when she was sick in January her family physician cut it down then.

## 2021-02-05 ENCOUNTER — HOSPITAL ENCOUNTER (OUTPATIENT)
Dept: MRI IMAGING | Age: 76
Discharge: HOME | End: 2021-02-07
Payer: MEDICARE

## 2021-02-05 DIAGNOSIS — D32.9 MENINGIOMA (HCC): ICD-10-CM

## 2021-02-05 PROCEDURE — A9577 INJ MULTIHANCE: HCPCS | Performed by: RADIOLOGY

## 2021-02-05 PROCEDURE — 70553 MRI BRAIN STEM W/O & W/DYE: CPT

## 2021-02-05 PROCEDURE — 6360000004 HC RX CONTRAST MEDICATION: Performed by: RADIOLOGY

## 2021-02-05 RX ADMIN — GADOBENATE DIMEGLUMINE 20 ML: 529 INJECTION, SOLUTION INTRAVENOUS at 13:55

## 2021-03-16 ENCOUNTER — HOSPITAL ENCOUNTER (OUTPATIENT)
Dept: RADIATION ONCOLOGY | Age: 76
Discharge: HOME | End: 2021-03-16
Payer: MEDICARE

## 2021-03-16 ENCOUNTER — TELEPHONE (OUTPATIENT)
Dept: CASE MANAGEMENT | Age: 76
End: 2021-03-16

## 2021-03-16 ENCOUNTER — TELEPHONE (OUTPATIENT)
Dept: RADIATION ONCOLOGY | Age: 76
End: 2021-03-16

## 2021-03-16 VITALS
DIASTOLIC BLOOD PRESSURE: 82 MMHG | RESPIRATION RATE: 18 BRPM | WEIGHT: 263.2 LBS | BODY MASS INDEX: 42.48 KG/M2 | HEART RATE: 83 BPM | OXYGEN SATURATION: 97 % | TEMPERATURE: 97.1 F | SYSTOLIC BLOOD PRESSURE: 136 MMHG

## 2021-03-16 DIAGNOSIS — D32.9 MENINGIOMA (HCC): Primary | ICD-10-CM

## 2021-03-16 PROCEDURE — 99205 OFFICE O/P NEW HI 60 MIN: CPT

## 2021-03-16 PROCEDURE — 99205 OFFICE O/P NEW HI 60 MIN: CPT | Performed by: SPECIALIST

## 2021-03-16 SDOH — ECONOMIC STABILITY: HOUSING INSECURITY: PLEASE ASSESS YOUR PATIENT'S LEVEL OF DISTRESS CONCERNING HOUSING (SCALE FROM 1-10): 0 (NONE)

## 2021-03-16 NOTE — TELEPHONE ENCOUNTER
Met with patient and daughter during her initial consultation with Dr. Beti Duenas for her Meningioma cancer diagnosis. Introduced myself and explained my role with patients receiving treatment at our center. Patient was friendly and receptive. They deny any needs or questions at this time. Instructed on next steps including radiation treatments per Dr. Emerson Broaden recommendations and follow up care. Provided with  literature Patient Resource Brain Tumors, Patient Resource Radiation Therapy and Community Transportation Resource List. Reviewed resources available including Social Work, Dietician and Financial Navigator. Provided with my contact information and instructed patient to call me with questions or concerns. Verbalizes understanding. Patient appreciative of visit. Will continue to follow.

## 2021-03-16 NOTE — PROGRESS NOTES
Radiation Oncology Consult Note         3/16/2021    Yohan Wong  76 y.o.   1945    REFERRING PROVIDER: Adrian Borges    PCP:  Eunice Lobato MD    CHIEF COMPLAINT: Severe headache    DIAGNOSIS: Multiply recurrent left frontal meningioma    STAGING: Cancer Staging  No matching staging information was found for the patient. HISTORY OF PRESENT ILLNESS: Ms. Yohan Wong  is a 76y.o. year old female with a long history of a multiply recurrent left frontal meningioma. Her first craniotomy was in 2003 where a left frontal meningioma was resected. She was then placed on surveillance and did well until 2010 at which time she had her second craniotomy. Her most recent craniotomy in 2016 resulted in a complete resection and surveillance was then continued. Last scan in August 2018 showed a stable MRI without evidence of recurrence. Unfortunately her most recent scan on February 5, 2021 confirmed interval enlargement of the meningioma along the anterior anterior hemispheric falx measuring 1.2 x 1.5 x 0.8 cm on her previous scan to 2.5 x 1.9 x 1.9 on the most recent scan. Her most recent symptoms includes daily headaches 10 out of 10 slight dizziness which is intermittent without loss of consciousness and right eye vision disturbance as well as faint occasional ringing in both ears. We have been asked to see her regarding nonoperative approaches including stereotactic radiosurgery. Laura Lopez     PAST MEDICAL HISTORY:      Diagnosis Date    Anxiety     Arthritis     Back pain     CAD (coronary artery disease)     Cancer (HCC)     meningioma X 2    Chest pain     Depression     Gastroesophageal reflux disease without esophagitis 10/21/2016    Headache(784.0)     Heart attack ([address]     Hyperlipidemia     intolerant of statins    Hypertension     Hypotension 10/25/2018    Hypothyroid     Iron deficiency anemia 10/24/2018    Receiving iron infusions hematology [facility]    Meningioma [facility]) 2902,1048 incomplete resection 2010    Obesity     weight 239    Obstructive jaundice 10/24/2018    Palpitations     Pruritus 10/25/2018    Seizure disorder ([address]     Sinus bradycardia 10/26/2018    Sleep apnea     no machine    Thyroid disease     Hypothyroidism       PAST SURGICAL HISTORY:      Procedure Laterality Date    BRAIN MENINGIOMA EXCISION  2003, 2010    CARDIOVERSION  09/06/2018    DR Fernandez Lower    CHOLECYSTECTOMY      COLONOSCOPY  12/16/2015    CRANIOTOMY Left 08/11/2016    FRONTAL MENINGIOMA x 3    ERCP N/A 10/26/2018    ERCP STONE REMOVAL performed by Charles Conner MD at [address] Bilateral     Cataracts    HYSTERECTOMY      [place] COLONOSCOPY FLX DX W/COLLJ SPEC WHEN PFRMD N/A 6/11/2018    COLONOSCOPY DIAGNOSTIC performed by Estrellita Bonilla MD at [address] [place] [address] LAP,CHOLECYSTECTOMY/GRAPH N/A 10/11/2018    LAPAROSCOPIC CHOLECYSTECTOMY, WITH GRAM performed by Estrellita Bonilla MD at [place] [address] TRANSESOPHAGEAL ECHOCARDIOGRAM  09/06/2018    Dr Mia Vick [Penicillins] Other (See Comments)     Unknown      Sulfa Antibiotics Swelling     Whole body and itching       MEDICATIONS:  Medications reviewed and reconciled. Current Outpatient Medications   Medication Sig Dispense Refill    carBAMazepine (TEGRETOL-XR) 100 MG extended release tablet Take 1 tablet by mouth 3 times daily 90 tablet 3    butalbital-aspirin-caffeine (FIORINAL) -40 MG per capsule Take 1 capsule by mouth 3 times daily for 50 days. 90 capsule 0    buPROPion (WELLBUTRIN XL) 300 MG extended release tablet TAKE ONE TABLET BY MOUTH EVERY MORNING      pravastatin (PRAVACHOL) 40 MG tablet TAKE ONE TABLET BY MOUTH DAILY  0    gabapentin (NEURONTIN) 300 MG capsule Take 1 capsule by mouth 3 times daily for 120 days. . 90 capsule 3    cholestyramine (QUESTRAN) 4 g packet Take 1 packet by mouth 2 times daily for 7 days Itching 14 packet 0    levothyroxine (SYNTHROID) 150 MCG tablet Take 150 mcg by mouth every morning      apixaban (ELIQUIS) 5 MG TABS tablet Take 1 tablet by mouth 2 times daily Resume medication in 48 hours (Patient taking differently: Take 5 mg by mouth 2 times daily ) 2 tablet 0    acetaminophen (TYLENOL) 325 MG tablet Take 650 mg by mouth every 6 hours as needed for Pain      FLUoxetine (PROZAC) 20 MG capsule Take 40 mg by mouth every morning       omeprazole (PRILOSEC) 20 MG delayed release capsule Take 20 mg by mouth daily as needed       metoprolol succinate (TOPROL XL) 25 MG extended release tablet Take 1 tablet by mouth daily (Patient taking differently: Take 12.5 mg by mouth every morning ) 30 tablet 3    metoclopramide (REGLAN) 5 MG tablet Take 1 tablet by mouth 3 times daily (before meals) (Patient not taking: Reported on 2019) 120 tablet 3    levETIRAcetam (KEPPRA) 500 MG tablet Take 1 tablet by mouth 2 times daily (Patient not taking: Reported on 2019) 90 tablet 2    losartan-hydrochlorothiazide (HYZAAR) 100-25 MG per tablet Take 1 tablet by mouth daily (Patient taking differently: Take 1 tablet by mouth every morning ) 90 tablet 3     No current facility-administered medications for this encounter. FAMILY HISTORY:  Family History   Problem Relation Age of Onset    Heart Disease Father          age 80    Arrhythmia Mother         a fib    Diabetes Mother          age 80 of heart complications    Breast Cancer Mother     Cancer Sister         BREAST    Cancer Sister         breast       SOCIAL HISTORY:       reports that she quit smoking about 26 years ago. Her smoking use included cigarettes. She started smoking about 63 years ago. She smoked 2.00 packs per day. She has never used smokeless tobacco..   reports current alcohol use. .   reports no history of drug use.     REVIEW OF SYSTEMS:  Obtained from the patient, chart review and nursing assessment. General ROS: positive for  - fatigue  Psychological ROS: positive for - memory difficulties  Ophthalmic ROS: negative  ENT ROS: negative  Allergy and Immunology ROS: negative  Hematological and Lymphatic ROS: negative  Endocrine ROS: negative  Breast ROS: negative  Respiratory ROS: no cough, shortness of breath, or wheezing  Cardiovascular ROS: negative  Gastrointestinal ROS: negative  Genito-Urinary ROS: negative  Musculoskeletal ROS: negative  Neurological ROS: positive for - dizziness, headaches and memory loss  Dermatological ROS: negative    PHYSICAL EXAMINATION:      Vitals:    03/16/21 0904   BP: 136/82   Pulse: 83   Resp: 18   Temp: 97.1 °F (36.2 °C)   TempSrc: Skin   SpO2: 97%   Weight: 263 lb 3.2 oz (119.4 kg)       Wt Readings from Last 3 Encounters:   03/16/21 263 lb 3.2 oz (119.4 kg)   12/07/20 255 lb (115.7 kg)   12/06/19 250 lb (113.4 kg)       KARNOSKY PERFORMANCE STATUS:      Constitutional: A well developed, well nourished 76 y.o. female who is alert, oriented, cooperative and in no apparent distress. Head was normocephalic and atraumatic. EENT: EOMI WALE. MMM. No icterus. No conjunctival injections. Neck: Supple without thyromegaly. Respiratory: Chest was symmetrical without dullness to percussion. Breath sounds bilaterally were clear to auscultation. Breasts: Deferred    Cardiovascular: Nonpalpable PMI. Regular without murmur. Abdomen:  rounded and soft without organomegaly. Lymphatic: No lymphadenopathy. Musculoskeletal: Muscle strength is 5 out of 5 in the left upper and lower extremities but 4 out of 5 predominantly to flexor testing on the right. Extremities:  No lower extremity edema, ulcerations, tenderness, varicosities or erythema. Coordination appears adequate. Sensory function appears intact. Skin:  Warm and dry. Examination of color, turgor and pigmentation was relatively normal. No bruises or skin rashes. Old surgical scars are noted. Neurological/Psychiatric: General behavior, level of consciousness, thought content is normal. The patient's emotional status is normal.  Cranial nerves are mostly intact although she did have some difficulty to tandem testing with a tendency to fall to the right. Her Romberg was somewhat equivocal likely positive. There is no evidence of pronator drift. RADIATION SAFETY AND ONCOLOGIC TREATMENT SUPPORT:    - Previous radiation history:  No  - History of autoimmune or connective tissue disease:  No  - Nutritional support/ PEG:  Not applicable  - Dental evaluation:  Not applicable  -  requested:  Not asked for.  - Oncology Nurse navigator requested:  - Transportation for daily treatment:  Self    TUMOR MARKERS: No results found for: PSA, CEA, , [id number],     IMAGING REVIEWED:  10/2018: Impression       Stable MRI of brain. No evidence of residual or recurrent left frontal   neoplasm. 11/14/2019:  Impression   1. Compared to the previous MRI from 8/10/2018, there is minimal   enlargement of nodular dural thickening likely related to meningioma   along the anterior interhemispheric falx from 8 x 6mm to 12 x 8 mm. No   significant mass effect is seen in the underlying brain. 2. Stable postoperative changes including encephalomalacia in the left   frontal lobe, related to prior meningioma resection. 2/5/2021:  Impression   1. Interval enlargement of the meningioma along the anterior interhemispheric   falx from approximately 1.2 x 1.5 x 0.8 cm to 2.5 x 1.9 x 1.9 cm.   2. The meningioma involves the anterior aspect of the superior sagittal sinus   and exerts mild mass effect on the paracentral aspects of the frontal lobes. 3. Stable postoperative changes related to prior resection of the meningioma.    4. Grossly stable but better visualized 2.3 x 1.0 cm focus of enhancement in   the right frontal bone, the etiology of which is unclear.  Bone scan is   recommended for further evaluation. RECOMMENDATIONS:   Bone scan recommended for the enhancing lesion in the right frontal bone. PATHOLOGY REVIEWED:  1/11/2018: Diagnosis:   A.  Left frontal subgaleal tumor, excision: Meningioma, extending into   fibroadipose tissue   B.  Left frontal subgaleal tumor, excision: Meningioma, extending into   fibroadipose tissue   C.  Left frontal lobe recurrent meningioma, excision: Recurrent   meningioma     IMPRESSION:   78-year-old female with a multiply recurrent left frontal meningioma now with evidence of progressive disease    DISCUSSION/PLAN:     I had a rather detailed discussion with the patient and her daughter who was in attendance today in an encounter which lasted 76 minutes 72 of which was spent reviewing her history to date including her radiographic images. I did schedule for her the current configuration of her disease including the small right frontal enhancement in the bone of uncertain etiology per radiology. Based on my discussion with Dr. Yobani Nicole, he believes her rather extensive history of craniotomies would likely render a bone scan as suggested by radiology to be inconclusive in this region. He also however believes that it is quite possible that this is a separate recurrent focus. Based on the above facts I have recommended stereotactic radiosurgery for the management of her disease with a plan to treat the dominant lesion which is inter-hemispheric as well as the frontal bone lesion with stereotactic radiosurgery. Plan to deliver 1-5 fractions up to 25 Gray if fractionated based on normal tissue constraints. Use the constraints from RTOG 9005 and 9508 is parameters for intracranial radiosurgery. There is a long history of stereotactic radiosurgery in the management of intracranial meningiomas especially those that are recurrent and or unresectable.   Did discuss the potential acute as well as long-term side effects of cranial radiosurgery and what to expect in follow-up. Patient and her daughter acknowledged understanding of the plan and were given an opportunity to ask any questions. We will make plans accordingly for treatment planning including a high resolution MRI sequence for target delineation as necessary. Procedures and process involved with CT simulation and daily radiation treatments were explained. Toxicities, both expected and less common, were reviewed in detail. Questions were answered to their apparent satisfaction. Thank you for the opportunity to participate in multidisciplinary management of this remarkable and pleasant patient.       Martha Light MD, [facility] Breana Felix, [address] [place] [address]    Department of Radiation Oncology  [facility]: [phone] ([facility]: [phone])  [address] [facility]: [phone] ([facility]: [phone])  [address] [facility]) [facility]:  [phone] ([facility]:  [phone])

## 2021-03-16 NOTE — PROGRESS NOTES
HYSTERECTOMY      [place] COLONOSCOPY FLX DX W/COLLJ SPEC WHEN PFRMD N/A 2018    COLONOSCOPY DIAGNOSTIC performed by Vidal Little MD at [address] [place] [address] LAP,CHOLECYSTECTOMY/GRAPH N/A 10/11/2018    LAPAROSCOPIC CHOLECYSTECTOMY, WITH GRAM performed by Vidal Little MD at [place] [address] TRANSESOPHAGEAL ECHOCARDIOGRAM  2018    Dr Olga Patricia History   Problem Relation Age of Onset    Heart Disease Father          age 80    Arrhythmia Mother         a fib    Diabetes Mother          age 80 of heart complications    Breast Cancer Mother     Cancer Sister         BREAST    Cancer Sister         breast       Social History     Socioeconomic History    Marital status:      Spouse name: Not on file    Number of children: 2    Years of education: Not on file    Highest education level: Not on file   Occupational History    Not on file   Social Needs    Financial resource strain: Not on file    Food insecurity     Worry: Not on file     Inability: Not on file    Transportation needs     Medical: Not on file     Non-medical: Not on file   Tobacco Use    Smoking status: Former Smoker     Packs/day: 2.00     Types: Cigarettes     Start date: 10/24/1957     Quit date:      Years since quittin.2    Smokeless tobacco: Never Used   Substance and Sexual Activity    Alcohol use: Yes     Comment: rare    Drug use: No    Sexual activity: Not Currently   Lifestyle    Physical activity     Days per week: Not on file     Minutes per session: Not on file    Stress: Not on file   Relationships    Social connections     Talks on phone: Not on file     Gets together: Not on file     Attends [employer] service: Not on file     Active member of club or organization: Not on file     Attends meetings of clubs or organizations: Not on file     Relationship status: Not on file    Intimate partner violence Fear of current or ex partner: Not on file     Emotionally abused: Not on file     Physically abused: Not on file     Forced sexual activity: Not on file   Other Topics Concern    Not on file   Social History Narrative    Not on file           Occupation:   Retired:  YES: Patient is retired from . REVIEW OF SYSTEMS: <<For Level 5, 10 or more systems>> Approximately> 20 mins spent with patient and her daughter in law about radiation therapy SRS to the brain utilizing handouts and slides. In 2003,2010 and 2016 pt had craniotomy surgery for a meningioma in the left  frontal of the brain by Dr. Valarie Steel. She presented in his office 2/5/2021 with complaints of headaches and dizziness. MRI from 2/5/2021 presented enlargement of the meningioma along the anterior inter-hemisphere falx from approximate 1.2x1.5x0.8cm to 2.5x1.9x1.9cm. She verbalized understanding of SRS, all questions answered from a nursing perspective and signed consent for treatment. She will be SIM'd 3/18/2021 at 1000am.    Pacemaker/Defibulator/ICD:  No    Mediport: No        FALLS RISK SCREENING ASSESSMENT    Instructions:  Assess the patient and enter the appropriate indicators that are present for fall risk identification. Total the numbers entered and assign a fall risk score from Table 2.  Reassess patient at a minimum every 12 weeks or with status change. Assessment   Date  3/16/2021     1. Mental Ability: confusion/cognitively impaired No - 0       2. Elimination Issues: incontinence, frequency No - 0       3. Ambulatory: use of assistive devices (walker, cane, off-loading devices), attached to equipment (IV pole, oxygen) Yes - 2     4. Sensory Limitations: dizziness, vertigo, impaired vision Yes - 3       5. Age 72 years or greater - 1       10. Medication: diuretics, strong analgesics, hypnotics, sedatives, antihypertensive agents   Yes - 3   7.   Falls:  recent history of falls within the last 3 months (not to include slipping or tripping)   Yes - 7   TOTAL 16    If score of 4 or greater was education given? Yes       TABLE 2   Risk Score Risk Level Plan of Care   0-3 Little or  No Risk 1. Provide assistance as indicated for ambulation activities  2. Reorient confused/cognitively impaired patient  3. Call-light/bell within patient's reach  4. Chair/bed in low position, stretcher/bed with siderails up except when performing patient care activities  5. Educate patient/family/caregiver on falls prevention  6.  Reassess in 12 weeks or with any noted change in patient condition which places them at a risk for a fall   4-6 Moderate Risk 1. Provide assistance as indicated for ambulation activities  2. Reorient confused/cognitively impaired patient  3. Call-light/bell within patient's reach  4. Chair/bed in low position, stretcher/bed with siderails up except when performing patient care activities  5. Educate patient/family/caregiver on falls prevention  6. Falls risk precaution (Yellow sticker Level II) placed on patient chart   7 or   Higher High Risk 1. Place patient in easily observable treatment room  2. Patient attended at all times by family member or staff  3. Provide assistance as indicated for ambulation activities  4. Reorient confused/cognitively impaired patient  5. Call-light/bell within patient's reach  6. Chair/bed in low position, stretcher/bed with siderails up except when performing patient care activities  7. Educate patient/family/caregiver on falls prevention  8. Falls risk precaution (Yellow sticker Level III) placed on patient chart           MALNUTRITION RISK SCREENING ASSESSMENT    Instructions:  Assess the patient and enter the appropriate indicators that are present for nutrition risk identification. Total the numbers entered and assign a risk score. Follow the appropriate action for total score listed below. Assessment   Date  3/16/2021     1.  Have you lost weight without trying? 0- No     2. Have you been eating poorly because of a decreased appetite? 0- No   3. Do you have a diagnosis of head and neck cancer? 0- No                                                                                    TOTAL 0          Score of 0-1: No action  Score 2 or greater:  · For Non-Diabetic Patient: Recommend adding Ensure Complete 2 x daily and provide patient with Ensure wellness bag with coupons  · For Diabetic Patient: Recommend adding Glucerna Shake 2 x daily and provide patient with Glucerna Wellness bag with coupons  · Route to the dietitian via [place]    · Are you having  difficulty performing daily routine tasks  due to fatigue or weakness (ie: bathing/showering, dressing, housework, meal prep, work, child Sharon Butt): No     · Do you have any arm flexibility/ROM restrictions, swelling or pain that limit activity: No     · Any changes in memory, attention/focus that impact daily activities: No     · Do you avoid participation in leisure/social activity due weakness, fatigue or pain: No     ARE ANY OF THE ABOVE ARE ANSWERED YES: No          PT ASSESSMENT FOR REFERRAL    · Have you had any recent falls in past 2 months: Yes     · Do you have difficulty  going up/down stairs: No     · Are you having difficulty walking: No     · Do you often hold onto furniture/environmental supports or feel off balance when you are walking: Yes     · Do you need to take rest breaks when you are walking: Yes     · Any pain on scale of 1-10 that limits your mobility: No 7/10    ARE ANY OF THE ABOVE ARE ANSWERED YES: yes, not needing referral                       [address] [place] [address]    - Is patient planned to receive Cisplatin? No. This patient is not planned to start Cisplatin. - Is patient planned to receive radiation therapy that may be directed toward auditory canals or nerves?  No. Patient is not planned to start radiation therapy to auditory canals or nerves. - Is patient complaining of new onset hearing loss? No. Patient is not complaining of new onset hearing loss. Patient education given on SRS to the left frontal brain. The patient expresses understanding and acceptance of instructions.  Camila Molina 3/16/2021 9:11 AM           Camila Molina

## 2021-03-17 ENCOUNTER — TELEPHONE (OUTPATIENT)
Dept: RADIATION ONCOLOGY | Age: 76
End: 2021-03-17

## 2021-03-17 ENCOUNTER — HOSPITAL ENCOUNTER (OUTPATIENT)
Age: 76
Discharge: HOME | End: 2021-03-17
Payer: MEDICARE

## 2021-03-17 ENCOUNTER — TELEPHONE (OUTPATIENT)
Dept: INFUSION THERAPY | Age: 76
End: 2021-03-17

## 2021-03-17 DIAGNOSIS — D32.9 MENINGIOMA (HCC): ICD-10-CM

## 2021-03-17 LAB
ANION GAP SERPL CALCULATED.3IONS-SCNC: 8 MMOL/L (ref 7–16)
BUN BLDV-MCNC: 22 MG/DL (ref 8–23)
CALCIUM SERPL-MCNC: 8.7 MG/DL (ref 8.6–10.2)
CHLORIDE BLD-SCNC: 102 MMOL/L (ref 98–107)
CO2: 29 MMOL/L (ref 22–29)
CREAT SERPL-MCNC: 1 MG/DL (ref 0.5–1)
GFR AFRICAN AMERICAN: >60
GFR NON-AFRICAN AMERICAN: 54 ML/MIN/1.73
GLUCOSE BLD-MCNC: 111 MG/DL (ref 74–99)
POTASSIUM SERPL-SCNC: 3.8 MMOL/L (ref 3.5–5)
SODIUM BLD-SCNC: 139 MMOL/L (ref 132–146)

## 2021-03-17 PROCEDURE — 36415 COLL VENOUS BLD VENIPUNCTURE: CPT

## 2021-03-17 PROCEDURE — 80048 BASIC METABOLIC PNL TOTAL CA: CPT

## 2021-03-17 NOTE — TELEPHONE ENCOUNTER
Rad Onc nurse called and notified patient that a basic metabolic panel needs to be done by tomorrow for her SIM. The order is in the system and she stated that she will go to [facility] today and have it done.

## 2021-03-17 NOTE — TELEPHONE ENCOUNTER
Benefits Investigation    Insurance: [employer]   Phone#:   ID#: [id number]   Group #:[id number]  Spoke with: Epic Registration  Reference #:  3/17/21 @ 9:07 am    Calendar Year : yes     Chemo Auth Needed:yes    Annual Deductible Amount:  $700  Amount met to date: $0  Out-of-Pocket Max Amount: $ 5900  Amount met to date: $275  Lifetime Maximum Amount: $ unlimited  Amount met to date: $n/a    Information was passed to [employer], pharmacy tech, to look for giovani/foundation assistance.  Electronically signed by Yung Stewart on 3/17/2021 at 9:09 AM

## 2021-03-18 ENCOUNTER — HOSPITAL ENCOUNTER (OUTPATIENT)
Dept: RADIATION ONCOLOGY | Age: 76
Discharge: HOME | End: 2021-03-18
Attending: SPECIALIST
Payer: MEDICARE

## 2021-03-18 PROCEDURE — 77470 SPECIAL RADIATION TREATMENT: CPT | Performed by: SPECIALIST

## 2021-03-18 PROCEDURE — 77334 RADIATION TREATMENT AID(S): CPT | Performed by: SPECIALIST

## 2021-03-18 PROCEDURE — 77263 THER RADIOLOGY TX PLNG CPLX: CPT | Performed by: SPECIALIST

## 2021-03-18 PROCEDURE — 6360000004 HC RX CONTRAST MEDICATION: Performed by: SPECIALIST

## 2021-03-18 RX ADMIN — IOPAMIDOL 120 ML: 755 INJECTION, SOLUTION INTRAVENOUS at 12:18

## 2021-03-19 ENCOUNTER — TELEPHONE (OUTPATIENT)
Dept: RADIATION ONCOLOGY | Age: 76
End: 2021-03-19

## 2021-03-19 NOTE — TELEPHONE ENCOUNTER
Rad Oncology RN placed call to schedule specialized MRI of the brain prior to [facility] planning-MRI was scheduled for 3/20/2021 per Ayana Snyder, lead MRI technician. Patient is  to arrive at [facility] @ 8:30 am-patient was agreeable to this appointment time. Dr Nu Alexis was updated.

## 2021-03-20 ENCOUNTER — HOSPITAL ENCOUNTER (OUTPATIENT)
Dept: MRI IMAGING | Age: 76
Discharge: HOME | End: 2021-03-22
Payer: MEDICARE

## 2021-03-20 DIAGNOSIS — D32.9 MENINGIOMA (HCC): ICD-10-CM

## 2021-03-20 PROCEDURE — 70552 MRI BRAIN STEM W/DYE: CPT

## 2021-03-20 PROCEDURE — A9579 GAD-BASE MR CONTRAST NOS,1ML: HCPCS | Performed by: RADIOLOGY

## 2021-03-20 PROCEDURE — 6360000004 HC RX CONTRAST MEDICATION: Performed by: RADIOLOGY

## 2021-03-20 RX ADMIN — GADOTERIDOL 40 ML: 279.3 INJECTION, SOLUTION INTRAVENOUS at 09:59

## 2021-04-09 PROCEDURE — 77338 DESIGN MLC DEVICE FOR IMRT: CPT | Performed by: SPECIALIST

## 2021-04-09 PROCEDURE — 77301 RADIOTHERAPY DOSE PLAN IMRT: CPT | Performed by: SPECIALIST

## 2021-04-09 PROCEDURE — 77300 RADIATION THERAPY DOSE PLAN: CPT | Performed by: SPECIALIST

## 2021-04-12 ENCOUNTER — HOSPITAL ENCOUNTER (OUTPATIENT)
Dept: RADIATION ONCOLOGY | Age: 76
End: 2021-04-12
Attending: SPECIALIST
Payer: MEDICARE

## 2021-04-14 ENCOUNTER — HOSPITAL ENCOUNTER (OUTPATIENT)
Dept: RADIATION ONCOLOGY | Age: 76
Discharge: HOME | End: 2021-04-14
Attending: SPECIALIST
Payer: MEDICARE

## 2021-04-14 PROCEDURE — 77373 STRTCTC BDY RAD THER TX DLVR: CPT | Performed by: SPECIALIST

## 2021-04-15 ENCOUNTER — APPOINTMENT (OUTPATIENT)
Dept: RADIATION ONCOLOGY | Age: 76
End: 2021-04-15
Attending: SPECIALIST
Payer: MEDICARE

## 2021-04-16 ENCOUNTER — HOSPITAL ENCOUNTER (OUTPATIENT)
Dept: RADIATION ONCOLOGY | Age: 76
Discharge: HOME | End: 2021-04-16
Attending: SPECIALIST
Payer: MEDICARE

## 2021-04-16 PROCEDURE — 77373 STRTCTC BDY RAD THER TX DLVR: CPT | Performed by: SPECIALIST

## 2021-04-16 NOTE — PROGRESS NOTES
Radiation Oncology          Ms. Palmer Noriega underwent fractionated external beam radiation therapy using a SBRT / SABR technique. I was personally present for the treatment planning imaging and pt setup to ensure appropriate immobilization to meet current REINIER standards. After a detailed review of the treatment plan and appropriate physics QA / oversight this pt was scheduled and underwent hypo fractionated treatment as noted per the D/I in [facility]. Typical fractionation schemes include but are not limited to 2500 Gy in 3-5 fractions. The NCCN guidelines and pt workup including a detailed discussion of the risks, benefits and alternative were discussed previously [RTOG dose constraints met per plan as applicable- see Mosaiq]. The pt verbalized understanding for the risks of this procedure today prior to Tx. Today, after a dual identification time out (including a brief plan overview )- I personally reviewed the complex multifaceted immobilization apparatus Synergy (PRN), patient position, and 4D imaging (if applicable) prior to the treatment delivery to ensure accuracy. The SBRT team, including myself, were all present for the set up CT scan and delivery of the fraction (the latter on a PRN basis). The patient successfully completed the procedure today in stable condition; this procedure was well-tolerated today. Palmer Noriega has now received 1000 cGy in 2/5 fractions directed to the Frontal Inter-hemispheric recurrent meningioma.       Javad Moon MD, [address], [facility], [address] [place] [address]    Department of Radiation Oncology  [facility]) [facility]: [phone] (OSM: [phone])  [address] [place] [address]) [facility]: [phone] (YZN: [phone])  [facility]) [facility]:  [phone])

## 2021-04-16 NOTE — PROGRESS NOTES
Radiation Oncology          Ms. Shy Arnold underwent fractionated external beam radiation therapy using a SBRT / SABR technique. I was personally present for the treatment planning imaging and pt setup to ensure appropriate immobilization to meet current REINIER standards. After a detailed review of the treatment plan and appropriate physics QA / oversight this pt was scheduled and underwent hypo fractionated treatment as noted per the D/I in [facility]. Typical fractionation schemes include but are not limited to 2500 cGy in 3-5 fractions. The NCCN guidelines and pt workup including a detailed discussion of the risks, benefits and alternative were discussed previously [RTOG dose constraints met per plan as applicable- see Mosaiq]. The pt verbalized understanding for the risks of this procedure today prior to Tx. Today, after a dual identification time out (including a brief plan overview )- I personally reviewed the complex multifaceted immobilization apparatus +/- Synergy (PRN), patient position, and 4D imaging (if applicable) prior to the treatment delivery to ensure accuracy. The SBRT team, including myself, were all present for the set up CT scan and delivery of the fraction (the latter on a PRN basis). The patient successfully completed the procedure today in stable condition; this procedure was well-tolerated today. Shy Arnold has now received 500 cGy in 1/5 fractions directed to the Frontal  Inter-hemispheric recurrent memingioma.       Mary Alvarado MD, Jenn Robb 9646, Anna Marie Chan, [address] [place] [address]    Department of Radiation Oncology  [facility]) [facility]: [phone] (WNL: [phone])  [address] [address]: [phone] (ROHIT: [phone])  [facility]) [facility]:  [phone] (DFD:  [phone])

## 2021-04-19 ENCOUNTER — CLINICAL DOCUMENTATION (OUTPATIENT)
Dept: RADIATION ONCOLOGY | Age: 76
End: 2021-04-19

## 2021-04-19 ENCOUNTER — HOSPITAL ENCOUNTER (OUTPATIENT)
Dept: RADIATION ONCOLOGY | Age: 76
Discharge: HOME | End: 2021-04-19
Attending: SPECIALIST
Payer: MEDICARE

## 2021-04-19 PROCEDURE — 77373 STRTCTC BDY RAD THER TX DLVR: CPT | Performed by: SPECIALIST

## 2021-04-19 NOTE — PROGRESS NOTES
Radiation Oncology          Ms. Mary Corral underwent fractionated external beam radiation therapy using a SBRT / SABR technique. I was personally present for the treatment planning (+/- 4DCT, with/without abdominal compression) imaging and patient setup to ensure appropriate immobilization to meet current REINIER standards. After a detailed review of the treatment plan and appropriate physics QA / oversight, this patient was scheduled and underwent hypofractionated treatment as noted per the D/I in [facility]. The NCCN guidelines and patient workup including a detailed discussion of the risks, benefits and alternatives were discussed previously (RTOG dose constraints met per plan). The patient verbalized understanding for the risks of this procedure today prior to treatment. Today, after a dual identification time out (including a brief plan overview), I personally reviewed the complex multifaceted immobilization apparatus with/without compression +/- Synergy (PRN), patient positioning, and 4D imaging (if applicable) prior to the treatment delivery to ensure accuracy. The SBRT team, including myself, were all present for the setup CT scan and delivery of the fraction. The patient successfully completed the procedure today in stable condition; this procedure was well tolerated today. Mary Corral has now received 1500 cGy in 3/5 fractions directed to the Ffrontal Inter-hemispheric recurrent meningioma.         Diony Sacnhes MD    Department of Radiation Oncology  [address] [place], [place] [address]tor [facility]) [facility]: [phone] (ONL: [phone])  [address] [place] [address]: [phone] (QPE: [phone])  [address] [facility]) [facility]:  [phone] (ZZA:  [phone])

## 2021-04-20 ENCOUNTER — APPOINTMENT (OUTPATIENT)
Dept: RADIATION ONCOLOGY | Age: 76
End: 2021-04-20
Attending: SPECIALIST
Payer: MEDICARE

## 2021-04-20 NOTE — PROGRESS NOTES
Radiation Oncology          Ms. Mary Corral underwent fractionated external beam radiation therapy using a SBRT / SABR technique. I was personally present for the treatment planning  imaging and pt setup to ensure appropriate immobilization to meet current REINIER standards. After a detailed review of the treatment plan and appropriate physics QA / oversight this pt was scheduled and underwent hypo fractionated treatment as noted per the D/I in [facility]. Typical fractionation schemes include but are not limited to 2500 cGy in 3-5 fractions. The NCCN guidelines and pt workup including a detailed discussion of the risks, benefits and alternative were discussed previously [RTOG dose constraints met per plan as applicable- see Mosaiq]. The pt verbalized understanding for the risks of this procedure today prior to Tx. Today, after a dual identification time out (including a brief plan overview )- I personally reviewed the complex multifaceted immobilization apparatus W/WO compression +/- Synergy (PRN), patient position, and 4D imaging (if applicable) prior to the treatment delivery to ensure accuracy. The SBRT team, including myself, were all present for the set up CT scan and delivery of the fraction (the latter on a PRN basis). The patient successfully completed the procedure today in stable condition; this procedure was well-tolerated today and without incident. Mary Corral has now received 1500 cGy in 3/5 fractions directed to the recurrent Frontal Inter-hemisphericmeningioma.       Halie Ulloa MD, Jenn Robb 3099, Libby Baker, [address] [place] [address]    Department of Radiation Oncology  [facility]) [facility]: [phone] (VPL: [phone])  Alfred Shelby) [facility]: [phone] (ALEJANDRA: [phone])  [facility]) [facility]:  [phone] (GEM:  [phone])

## 2021-04-21 ENCOUNTER — HOSPITAL ENCOUNTER (OUTPATIENT)
Dept: RADIATION ONCOLOGY | Age: 76
Discharge: HOME | End: 2021-04-21
Attending: SPECIALIST
Payer: MEDICARE

## 2021-04-21 PROCEDURE — 77373 STRTCTC BDY RAD THER TX DLVR: CPT | Performed by: SPECIALIST

## 2021-04-21 RX ORDER — DEXAMETHASONE 0.5 MG/1
TABLET ORAL
Qty: 118 TABLET | Refills: 0 | Status: SHIPPED | OUTPATIENT
Start: 2021-04-21 | End: 2021-05-06

## 2021-04-22 ENCOUNTER — TELEPHONE (OUTPATIENT)
Dept: RADIATION ONCOLOGY | Age: 76
End: 2021-04-22

## 2021-04-23 ENCOUNTER — HOSPITAL ENCOUNTER (OUTPATIENT)
Dept: RADIATION ONCOLOGY | Age: 76
Discharge: HOME | End: 2021-04-23
Attending: SPECIALIST
Payer: MEDICARE

## 2021-04-23 ENCOUNTER — CLINICAL DOCUMENTATION (OUTPATIENT)
Dept: RADIATION ONCOLOGY | Age: 76
End: 2021-04-23

## 2021-04-23 PROCEDURE — 77336 RADIATION PHYSICS CONSULT: CPT | Performed by: SPECIALIST

## 2021-04-23 PROCEDURE — 77373 STRTCTC BDY RAD THER TX DLVR: CPT | Performed by: SPECIALIST

## 2021-04-23 PROCEDURE — 77435 SBRT MANAGEMENT: CPT | Performed by: SPECIALIST

## 2021-04-23 NOTE — PROGRESS NOTES
Radiation Oncology          Ms. Doris Ruiz underwent fractionated external beam radiation therapy using a SBRT / SABR technique. I was personally present for the treatment planning (+/- 4DCT, with/without abdominal compression) imaging and patient setup to ensure appropriate immobilization to meet current REINIER standards. After a detailed review of the treatment plan and appropriate physics QA / oversight, this patient was scheduled and underwent hypofractionated treatment as noted per the D/I in [facility]. The NCCN guidelines and patient workup including a detailed discussion of the risks, benefits and alternatives were discussed previously (RTOG dose constraints met per plan). The patient verbalized understanding for the risks of this procedure today prior to treatment. Today, after a dual identification time out (including a brief plan overview), I personally reviewed the complex multifaceted immobilization apparatus with/without compression +/- Synergy (PRN), patient positioning, and 4D imaging (if applicable) prior to the treatment delivery to ensure accuracy. The SBRT team, including myself, were all present for the setup CT scan and delivery of the fraction. The patient successfully completed the procedure today in stable condition; this procedure was well tolerated today. Doris Ruiz has now received 2500 cGy in 5/5 fractions directed to the frontal inter-hemispheric recurrent meningioma.         Villa Dorsey MD    Department of Radiation Oncology  [address]: [phone] (OZY: [phone])  [address] [place] [address]: [phone] (RZ: [phone])  [facility]:  [phone] (QWD:  [phone])

## 2021-05-05 NOTE — PROGRESS NOTES
Radiation Treatment Summary    Patient Name:  Kianna Villanueva,  1945,  76 y.o., female       Referring Physician: Alan Trammell MD  [address] [facility], [facility],  [address] [facility]      PCP: Shara Salter MD       Diagnosis: Recurrent Inter-hemispheric Meningioma       Recent History: Headache from progressive meningioma    Site Start [address] [facility],[address] [facility] ED Fractions Dose Fx Dose Technique   Left Frontal Inter-Hemispheric Meningioma 2021 9 5 2500 500 SRS                 Response/Tolerance: Patient reported no treatment-related side effects. Follow-up:  Patient to return for 30-day status check with MRI surveillance imaging to start 12-weeks thereafter. Thank you for the opportunity to participate in multidisciplinary management of this remarkable and pleasant patient.       Ronald Maria MD, Jenn Robb 1499, Cony Kee, [facility]    Department of Radiation Oncology  [facility]) [facility]: [phone] (SRQ: [phone])  Gunjan Chavez) [facility]: [phone])  [facility]) [facility]:  [phone] (KLD:  [phone])

## 2021-05-05 NOTE — PROGRESS NOTES
Radiation Oncology          Ms. Keenan Bess underwent fractionated external beam radiation therapy using a SBRT / SABR technique. I was personally present for the treatment planning imaging and pt setup to ensure appropriate immobilization to meet current REINIER standards. After a detailed review of the treatment plan and appropriate physics QA / oversight this pt was scheduled and underwent hypo fractionated treatment as noted per the D/I in [facility]. Typical fractionation schemes include but are not limited to 2500 cGy in 3-5 fractions. The NCCN guidelines and pt workup including a detailed discussion of the risks, benefits and alternative were discussed previously [RTOG dose constraints met per plan as applicable- see Mosaiq]. The pt verbalized understanding for the risks of this procedure today prior to Tx. Today, after a dual identification time out (including a brief plan overview )- I personally reviewed the complex multifaceted immobilization apparatus  +/- Synergy (PRN), patient position, and 4D imaging (if applicable) prior to the treatment delivery to ensure accuracy. The SBRT team, including myself, were all present for the set up CT scan and delivery of the fraction (the latter on a PRN basis). The patient successfully completed the procedure today in stable condition; this procedure was well-tolerated today. Keenan Bess has now received 2000 cGy in 4/5 fractions directed to the frontal inter-hemispheric recurrent meningioma.       Issa Shaikh MD, Jenn Robb 3422, Lety Neumann, [address] [place] [address]    Department of Radiation Oncology  [facility]) [facility]: [phone] (CLN: [phone])  Noe Tovar) [facility]: [phone] (SFL: [phone])  [facility]) [facility]:  [phone] (CARLOS:  [phone])

## 2021-05-26 ENCOUNTER — HOSPITAL ENCOUNTER (OUTPATIENT)
Dept: RADIATION ONCOLOGY | Age: 76
Discharge: HOME | End: 2021-05-26
Attending: SPECIALIST
Payer: MEDICARE

## 2021-05-26 VITALS
DIASTOLIC BLOOD PRESSURE: 78 MMHG | HEART RATE: 67 BPM | OXYGEN SATURATION: 98 % | SYSTOLIC BLOOD PRESSURE: 116 MMHG | RESPIRATION RATE: 16 BRPM | BODY MASS INDEX: 41.79 KG/M2 | TEMPERATURE: 97.6 F | WEIGHT: 258.9 LBS

## 2021-05-26 DIAGNOSIS — Z92.3 S/P RADIATION THERAPY: ICD-10-CM

## 2021-05-26 DIAGNOSIS — D32.9 MENINGIOMA (HCC): Primary | ICD-10-CM

## 2021-05-26 DIAGNOSIS — Z01.812 BLOOD TESTS PRIOR TO TREATMENT OR PROCEDURE: Primary | ICD-10-CM

## 2021-05-26 PROCEDURE — 99999 PR OFFICE/OUTPT VISIT,PROCEDURE ONLY: CPT | Performed by: NURSE PRACTITIONER

## 2021-05-26 NOTE — PROGRESS NOTES
Brittney Wyatt  5/26/2021  8:55 AM      Vitals:    05/26/21 0852   BP: 116/78   Pulse: 67   Resp: 16   Temp: 97.6 °F (36.4 °C)   SpO2: 98%    : Wt Readings from Last 3 Encounters:   05/26/21 258 lb 14.4 oz (117.4 kg)   03/16/21 263 lb 3.2 oz (119.4 kg)   12/07/20 255 lb (115.7 kg)                Current Outpatient Medications:     carBAMazepine (TEGRETOL-XR) 100 MG extended release tablet, Take 1 tablet by mouth 3 times daily, Disp: 90 tablet, Rfl: 3    butalbital-aspirin-caffeine (FIORINAL) -40 MG per capsule, Take 1 capsule by mouth 3 times daily for 50 days. , Disp: 90 capsule, Rfl: 0    buPROPion (WELLBUTRIN XL) 300 MG extended release tablet, 150 mg , Disp: , Rfl:     pravastatin (PRAVACHOL) 40 MG tablet, TAKE ONE TABLET BY MOUTH DAILY, Disp: , Rfl: 0    gabapentin (NEURONTIN) 300 MG capsule, Take 1 capsule by mouth 3 times daily for 120 days. ., Disp: 90 capsule, Rfl: 3    cholestyramine (QUESTRAN) 4 g packet, Take 1 packet by mouth 2 times daily for 7 days Itching, Disp: 14 packet, Rfl: 0    levothyroxine (SYNTHROID) 150 MCG tablet, Take 150 mcg by mouth every morning, Disp: , Rfl:     apixaban (ELIQUIS) 5 MG TABS tablet, Take 1 tablet by mouth 2 times daily Resume medication in 48 hours (Patient taking differently: Take 5 mg by mouth 2 times daily ), Disp: 2 tablet, Rfl: 0    acetaminophen (TYLENOL) 325 MG tablet, Take 650 mg by mouth every 6 hours as needed for Pain, Disp: , Rfl:     FLUoxetine (PROZAC) 20 MG capsule, Take 40 mg by mouth every morning , Disp: , Rfl:     omeprazole (PRILOSEC) 20 MG delayed release capsule, Take 20 mg by mouth daily as needed , Disp: , Rfl:     metoprolol succinate (TOPROL XL) 25 MG extended release tablet, Take 1 tablet by mouth daily (Patient taking differently: Take 12.5 mg by mouth every morning ), Disp: 30 tablet, Rfl: 3    metoclopramide (REGLAN) 5 MG tablet, Take 1 tablet by mouth 3 times daily (before meals) (Patient not taking: Reported on 12/6/2019), Disp: 120 tablet, Rfl: 3    levETIRAcetam (KEPPRA) 500 MG tablet, Take 1 tablet by mouth 2 times daily (Patient not taking: Reported on 12/6/2019), Disp: 90 tablet, Rfl: 2    losartan-hydrochlorothiazide (HYZAAR) 100-25 MG per tablet, Take 1 tablet by mouth daily (Patient taking differently: Take 1 tablet by mouth every morning ), Disp: 90 tablet, Rfl: 3      Patient is seen today in follow up for  Completion of SRS frontal inter-hemisphere meningioma completed on 4/23/21 5 fx and 2500cGy. Pt follows Dr. Mike Crowe and last Amrita Nuñez him on 3/9/21. Pt complains of sharp pains in her head that last a less than 10 seconds and comes out of the blue. The patient has no more questions or concerns at this time. FALLS RISK SCREENING ASSESSMENT    Instructions:  Assess the patient and enter the appropriate indicators that are present for fall risk identification. Total the numbers entered and assign a fall risk score from Table 2.  Reassess patient at a minimum every 12 weeks or with status change. Assessment   Date  5/26/2021     1. Mental Ability: confusion/cognitively impaired No - 0       2. Elimination Issues: incontinence, frequency No - 0       3. Ambulatory: use of assistive devices (walker, cane, off-loading devices), attached to equipment (IV pole, oxygen) Yes - 2     4. Sensory Limitations: dizziness, vertigo, impaired vision Yes - 3       5. Age 72 years or greater - 1       10. Medication: diuretics, strong analgesics, hypnotics, sedatives, antihypertensive agents   Yes - 3   7. Falls:  recent history of falls within the last 3 months (not to include slipping or tripping)   Yes - 7   TOTAL 16    If score of 4 or greater was education given? Yes       TABLE 2   Risk Score Risk Level Plan of Care   0-3 Little or  No Risk 1. Provide assistance as indicated for ambulation activities  2. Reorient confused/cognitively impaired patient  3. Call-light/bell within patient's reach  4.   Chair/bed in low position, stretcher/bed with siderails up except when performing patient care activities  5. Educate patient/family/caregiver on falls prevention  6.  Reassess in 12 weeks or with any noted change in patient condition which places them at a risk for a fall   4-6 Moderate Risk 1. Provide assistance as indicated for ambulation activities  2. Reorient confused/cognitively impaired patient  3. Call-light/bell within patient's reach  4. Chair/bed in low position, stretcher/bed with siderails up except when performing patient care activities  5. Educate patient/family/caregiver on falls prevention  6. Falls risk precaution (Yellow sticker Level II) placed on patient chart   7 or   Higher High Risk 1. Place patient in easily observable treatment room  2. Patient attended at all times by family member or staff  3. Provide assistance as indicated for ambulation activities  4. Reorient confused/cognitively impaired patient  5. Call-light/bell within patient's reach  6. Chair/bed in low position, stretcher/bed with siderails up except when performing patient care activities  7. Educate patient/family/caregiver on falls prevention  8. Falls risk precaution (Yellow sticker Level III) placed on patient chart           MALNUTRITION RISK SCREENING ASSESSMENT    5/26/2021   Patient:  Leyla Hubbard  Sex:  female    Instructions:  Assess the patient and enter the appropriate indicators that are present for nutrition risk identification. Total the numbers entered and assign a risk score. Follow the appropriate action for total score listed below. Assessment   Date  5/26/2021     1. Have you lost weight without trying? 0- No     2. Have you been eating poorly because of a decreased appetite? 0- No   3. Do you have a diagnosis of head and neck cancer?       0- No                                                                                    TOTAL 0          Score of 0-1: No action  Score 2 or greater:  · For Non-Diabetic Patient: Recommend adding Ensure Complete 2 x daily and provide patient with Ensure wellness bag with coupons  · For Diabetic Patient: Recommend adding Glucerna Shake 2 x daily and provide patient with Glucerna Wellness bag with coupons  · Route to the dietitian via Javid Delvalle RN

## 2021-06-17 ENCOUNTER — TELEPHONE (OUTPATIENT)
Dept: PHARMACY | Age: 76
End: 2021-06-17

## 2021-09-02 ENCOUNTER — TELEPHONE (OUTPATIENT)
Dept: PHARMACY | Facility: CLINIC | Age: 76
End: 2021-09-02

## 2021-09-02 NOTE — TELEPHONE ENCOUNTER
[facility][note 3] CLINICAL PHARMACY REVIEW: ADHERENCE REVIEW  Identified care gap per [employer]; fills at [facility]: ACE/ARB and Statin adherence    Last Visit: unknown - PCP appears to be an affiliate provider    Patient found in Outcomes MTM and is not currently eligible for CMR/TIP    ASSESSMENT  ACE/ARB ADHERENCE    Per Insurance Records through 8/13/21 (2020 [place] = 94%; YTD [place] = 95%; Potential Fail Date: 12/12/21): Losartan/HCTZ last filled on 7/15/21 for 90 day supply. Next refill due: 10/13/21    BP Readings from Last 3 Encounters:   05/26/21 116/78   03/16/21 136/82   12/07/20 124/74     CrCl cannot be calculated (Patient's most recent lab result is older than the maximum 120 days allowed. ). [facility]    Per Insurance Records through 8/13/21 ([address] [place] = 89%; YTD [place] = 81%; Potential Fail Date: 9/13/21):   Pravastatin last filled on 4/12/21 for 90 day supply. Next refill due: 7/11/21    Per [facility] Records:  Pravastatin last filled on 7/28/21 for 90 day supply and RTS. Lab Results   Component Value Date    CHOL 261 (H) 11/02/2018    TRIG 147 11/02/2018    HDL 46 11/02/2018    LDLCALC 186 (H) 11/02/2018     ALT   Date Value Ref Range Status   11/12/2018 53 (H) 0 - 32 U/L Final     AST   Date Value Ref Range Status   11/12/2018 57 (H) 0 - 31 U/L Final     The 10-year ASCVD risk score (Brittaney Del Valle et al., 2013) is: 19.6%    Values used to calculate the score:      Age: 68 years      Sex: Female      Is Non- : No      Diabetic: No      Tobacco smoker: No      Systolic Blood Pressure: 165 mmHg      Is BP treated: Yes      HDL Cholesterol: 46 mg/dL      Total Cholesterol: 261 mg/dL     PLAN  The following are interventions that have been identified:   - Patient overdue refilling pravastatin. Unsure if patient is still prescribed this medication. Attempting to reach patient to review.  Left message asking for return call.  Will attempt to contact the patient again to discuss pravastatin adherence. Will defer refilling at the pharmacy until after the second attempt has been made.      Future Appointments   Date Time Provider Leanna Ochoa   9/15/2021  9:00 AM LUIS Crane - CNP [facility]       Mirtha Seymour, PharmD, [address] // Department, toll free [phone], option 1

## 2021-09-02 NOTE — LETTER
[place]  [address], [address]ige Re[address]        Prem Barlow 112   [facility] [facility]           09/03/21     Dear Prem Barlowlisa 112,    We tried to reach you recently regarding your pravastatin, but were unable to reach you on the telephone. We have on file that you are currently taking pravastatin 40 mg once daily. If you are no longer taking or taking differently, please call us at the number below so that we can discuss this and update your medication profile. It appears that this medication has not been filled at proper times. We are worried you might be missing doses or not taking it as directed. It is important that you take your medications regularly and try not to miss a single dose.     Some ways to help you remember to take and refill your medications are to:  · Use a pill box, set an alarm, and/or keep your medication near something that you do every day  · Ask your pharmacy if they participate in [facility]", a program where you can  all of your medications on the same day  · Ask your pharmacy if you can be set up with automatic refill, where they will automatically refill your prescription when it is due and let you know it's ready to     Sincerely,   Jennifer Martinez, Dash, Colleen // Department, toll free [phone], option 1

## 2021-09-03 NOTE — TELEPHONE ENCOUNTER
Second attempt made to contact the patient to discuss pravastatin adherence. Left message for patient to return my call. Will prepare and send a letter to the patient today. Called [facility]: pharmacist will process refill for pravastatin 90-ds today. Billed through [address] [place] [address] Dr.. Mack jordana for f/u on 9/10/21 to ensure pick-up.      Storm Caicedo PharmD, Colleen // Department, toll free [phone], option [address] [place] [address] in place:  No   Recommendation Provided To: Pharmacy: 1   Gap Closed?: Yes    Intervention Accepted By: Pharmacy: 1   Time Spent (min): 15

## 2021-09-11 ENCOUNTER — HOSPITAL ENCOUNTER (OUTPATIENT)
Age: 76
Discharge: HOME | End: 2021-09-11
Payer: MEDICARE

## 2021-09-11 ENCOUNTER — HOSPITAL ENCOUNTER (OUTPATIENT)
Dept: MRI IMAGING | Age: 76
Discharge: HOME | End: 2021-09-13
Payer: MEDICARE

## 2021-09-11 DIAGNOSIS — Z92.3 S/P RADIATION THERAPY: ICD-10-CM

## 2021-09-11 DIAGNOSIS — Z01.812 BLOOD TESTS PRIOR TO TREATMENT OR PROCEDURE: ICD-10-CM

## 2021-09-11 DIAGNOSIS — D32.9 MENINGIOMA (HCC): ICD-10-CM

## 2021-09-11 LAB
BUN BLDV-MCNC: 23 MG/DL (ref 6–23)
CREAT SERPL-MCNC: 1 MG/DL (ref 0.5–1)
GFR AFRICAN AMERICAN: >60
GFR NON-AFRICAN AMERICAN: 54 ML/MIN/1.73

## 2021-09-11 PROCEDURE — 82565 ASSAY OF CREATININE: CPT

## 2021-09-11 PROCEDURE — A9577 INJ MULTIHANCE: HCPCS | Performed by: RADIOLOGY

## 2021-09-11 PROCEDURE — 84520 ASSAY OF UREA NITROGEN: CPT

## 2021-09-11 PROCEDURE — 70553 MRI BRAIN STEM W/O & W/DYE: CPT

## 2021-09-11 PROCEDURE — 6360000004 HC RX CONTRAST MEDICATION: Performed by: RADIOLOGY

## 2021-09-11 PROCEDURE — 36415 COLL VENOUS BLD VENIPUNCTURE: CPT

## 2021-09-11 RX ADMIN — GADOBENATE DIMEGLUMINE 40 ML: 529 INJECTION, SOLUTION INTRAVENOUS at 14:19

## 2021-09-15 ENCOUNTER — HOSPITAL ENCOUNTER (OUTPATIENT)
Dept: RADIATION ONCOLOGY | Age: 76
Discharge: HOME | End: 2021-09-15
Attending: SPECIALIST
Payer: MEDICARE

## 2021-09-15 VITALS
RESPIRATION RATE: 20 BRPM | BODY MASS INDEX: 41.25 KG/M2 | TEMPERATURE: 96 F | WEIGHT: 255.56 LBS | HEART RATE: 57 BPM | SYSTOLIC BLOOD PRESSURE: 120 MMHG | DIASTOLIC BLOOD PRESSURE: 74 MMHG | OXYGEN SATURATION: 95 %

## 2021-09-15 DIAGNOSIS — Z71.2 ENCOUNTER TO DISCUSS TEST RESULTS: ICD-10-CM

## 2021-09-15 DIAGNOSIS — D32.9 MENINGIOMA (HCC): Primary | ICD-10-CM

## 2021-09-15 DIAGNOSIS — Z92.3 STATUS POST STEREOTACTIC RADIOSURGERY: ICD-10-CM

## 2021-09-15 DIAGNOSIS — Z01.812 BLOOD TESTS PRIOR TO TREATMENT OR PROCEDURE: ICD-10-CM

## 2021-09-15 PROCEDURE — 99213 OFFICE O/P EST LOW 20 MIN: CPT | Performed by: NURSE PRACTITIONER

## 2021-09-15 PROCEDURE — 99212 OFFICE O/P EST SF 10 MIN: CPT

## 2021-09-15 NOTE — PROGRESS NOTES
Marquita Mckoy  9/15/2021  9:27 AM      Vitals:    09/15/21 0922   BP: 120/74   Pulse: 57   Resp: 20   Temp: 96 °F (35.6 °C)   SpO2: 95%    :     Wt Readings from Last 3 Encounters:   09/15/21 255 lb 9 oz (115.9 kg)   05/26/21 258 lb 14.4 oz (117.4 kg)   03/16/21 263 lb 3.2 oz (119.4 kg)                Current Outpatient Medications:     carBAMazepine (TEGRETOL-XR) 100 MG extended release tablet, Take 1 tablet by mouth 3 times daily, Disp: 90 tablet, Rfl: 3    buPROPion (WELLBUTRIN XL) 300 MG extended release tablet, 150 mg , Disp: , Rfl:     pravastatin (PRAVACHOL) 40 MG tablet, TAKE ONE TABLET BY MOUTH DAILY, Disp: , Rfl: 0    levothyroxine (SYNTHROID) 150 MCG tablet, Take 150 mcg by mouth every morning, Disp: , Rfl:     apixaban (ELIQUIS) 5 MG TABS tablet, Take 1 tablet by mouth 2 times daily Resume medication in 48 hours (Patient taking differently: Take 5 mg by mouth 2 times daily ), Disp: 2 tablet, Rfl: 0    acetaminophen (TYLENOL) 325 MG tablet, Take 650 mg by mouth every 6 hours as needed for Pain, Disp: , Rfl:     FLUoxetine (PROZAC) 20 MG capsule, Take 40 mg by mouth every morning , Disp: , Rfl:     omeprazole (PRILOSEC) 20 MG delayed release capsule, Take 20 mg by mouth daily as needed , Disp: , Rfl:     metoprolol succinate (TOPROL XL) 25 MG extended release tablet, Take 1 tablet by mouth daily (Patient taking differently: Take 12.5 mg by mouth every morning ), Disp: 30 tablet, Rfl: 3    metoclopramide (REGLAN) 5 MG tablet, Take 1 tablet by mouth 3 times daily (before meals), Disp: 120 tablet, Rfl: 3    levETIRAcetam (KEPPRA) 500 MG tablet, Take 1 tablet by mouth 2 times daily (Patient not taking: Reported on 12/6/2019), Disp: 90 tablet, Rfl: 2    losartan-hydrochlorothiazide (HYZAAR) 100-25 MG per tablet, Take 1 tablet by mouth daily (Patient taking differently: Take 1 tablet by mouth every morning ), Disp: 90 tablet, Rfl: 3      Patient is seen today in follow up for meningioma    Sidney No is a very pleasant 68years old female, she is here with her son Radha Keane for a follow up after completed SRS  To interhemispheric 5 fractions; 2500 cGy completed 4/23/21. He is alert and oriented x 4, ambulating with a cane,  Denies pain at this time but states she gets headaches which she takes Tylenol arthritis with relief. She also states she gets dizzy more often, I updated NIKHIL Levine.  9/11/21 She had her MRI of brain which showed stable extra-axial mass located in the anterior interhemispheric fissure suggesting stable meningioma. I gave her education and handout re: fall safety prevention, she verbalizes understanding. FALLS RISK SCREENING ASSESSMENT    Instructions:  Assess the patient and enter the appropriate indicators that are present for fall risk identification. Total the numbers entered and assign a fall risk score from Table 2.  Reassess patient at a minimum every 12 weeks or with status change. Assessment   Date  9/15/2021     1. Mental Ability: confusion/cognitively impaired No - 0       2. Elimination Issues: incontinence, frequency Yes - 3/ better than was       3. Ambulatory: use of assistive devices (walker, cane, off-loading devices), attached to equipment (IV pole, oxygen) Yes - 2/ cane     4. Sensory Limitations: dizziness, vertigo, impaired vision Yes - 3/ dizziness all the time       5. Age 72 years or greater - 1       10. Medication: diuretics, strong analgesics, hypnotics, sedatives, antihypertensive agents   Yes - 3   7. Falls:  recent history of falls within the last 3 months (not to include slipping or tripping)   No - 0   TOTAL 12    If score of 4 or greater was education given? Yes       TABLE 2   Risk Score Risk Level Plan of Care   0-3 Little or  No Risk 1. Provide assistance as indicated for ambulation activities  2. Reorient confused/cognitively impaired patient  3. Call-light/bell within patient's reach  4.   Chair/bed in low position, stretcher/bed with siderails up except when performing patient care activities  5. Educate patient/family/caregiver on falls prevention  6.  Reassess in 12 weeks or with any noted change in patient condition which places them at a risk for a fall   4-6 Moderate Risk 1. Provide assistance as indicated for ambulation activities  2. Reorient confused/cognitively impaired patient  3. Call-light/bell within patient's reach  4. Chair/bed in low position, stretcher/bed with siderails up except when performing patient care activities  5. Educate patient/family/caregiver on falls prevention  6. Falls risk precaution (Yellow sticker Level II) placed on patient chart   7 or   Higher High Risk 1. Place patient in easily observable treatment room  2. Patient attended at all times by family member or staff  3. Provide assistance as indicated for ambulation activities  4. Reorient confused/cognitively impaired patient  5. Call-light/bell within patient's reach  6. Chair/bed in low position, stretcher/bed with siderails up except when performing patient care activities  7. Educate patient/family/caregiver on falls prevention  8. Falls risk precaution (Yellow sticker Level III) placed on patient chart           MALNUTRITION RISK SCREENING ASSESSMENT    9/15/2021   Patient:  Juanita Blackburn  Sex:  female    Instructions:  Assess the patient and enter the appropriate indicators that are present for nutrition risk identification. Total the numbers entered and assign a risk score. Follow the appropriate action for total score listed below. Assessment   Date  9/15/2021     1. Have you lost weight without trying? 0- No     2. Have you been eating poorly because of a decreased appetite? 0- No   3. Do you have a diagnosis of head and neck cancer?       0- No                                                                                    TOTAL 0          Score of 0-1: No action  Score 2 or greater:  · For Non-Diabetic

## 2021-09-15 NOTE — PROGRESS NOTES
Radiation Oncology   4 Month Follow Up Note    09/15/2021     Keyanna Snyder  Vitals:    09/15/21 0922   BP: 120/74   Pulse: 57   Resp: 20   Temp: 96 °F (35.6 °C)   SpO2: 95%     Wt Readings from Last 1 Encounters:   09/15/21 255 lb 9 oz (115.9 kg)       Marixa Haynes MD,      CC: Patient is here for 4 month Radiation Oncology follow-up visit. HPI:  Keyanna Snyder is a pleasant 68 y.o. female with a diagnosis of recurrent left frontal meningioma. The patient completed stereotactic radiosurgery (SRS) on 04/23/2021, the interhemispheral received 2500 cGy/ 5 fractions. The patient is seen today in 4 month Radiation Oncology follow-up visit, she is accompanied by her son Cyndie Aguero into exam room. The patient reports frontal headaches as ongoing, varying in duration from seconds, to hours to days. No migraine headache, no vision changes no nausea or vomiting. Denies increased frequency or intensity of headaches. The patient reports headaches relieved after taking Tylenol arthritis. The patient reports \"dizziness\" described further as feeling off balanced, occurs when standing as ongoing and unchanged. No syncope or recent falls. Uses cane to ambulate. The patient denies seizure activity, behavior changes, memory loss or confusion. Pain: Headache and generalized arthritic joint pain complaints- controlled taking Tylenol arthritis as needed.      Past Medical History:   Diagnosis Date    Anxiety     Arthritis     Back pain     CAD (coronary artery disease)     Cancer (HCC)     meningioma X 2    Chest pain     Depression     Gastroesophageal reflux disease without esophagitis 10/21/2016    Headache(784.0)     Heart attack ([address]     Hyperlipidemia     intolerant of statins    Hypertension     Hypotension 10/25/2018    Hypothyroid     Iron deficiency anemia 10/24/2018    Receiving iron infusions hematology [facility]) [id number]    incomplete resection 2010    Obesity weight 239    Obstructive jaundice 10/24/2018    Palpitations     Pruritus 10/25/2018    Seizure disorder ([address]     Sinus bradycardia 10/26/2018    Sleep apnea     no machine    Thyroid disease     Hypothyroidism         Past Surgical History:   Procedure Laterality Date    BRAIN MENINGIOMA EXCISION  ,     CARDIOVERSION  2018    [address]    CHOLECYSTECTOMY      COLONOSCOPY  2015    CRANIOTOMY Left 2016    FRONTAL MENINGIOMA x 3    ERCP N/A 10/26/2018    ERCP STONE REMOVAL performed by Stefano Vargas MD at [address] Bilateral     Cataracts    HYSTERECTOMY      CO COLONOSCOPY FLX DX W/COLLJ SPEC WHEN PFRMD N/A 2018    COLONOSCOPY DIAGNOSTIC performed by Selena Rincon MD at [facility] LAP,CHOLECYSTECTOMY/GRAPH N/A 10/11/2018    LAPAROSCOPIC CHOLECYSTECTOMY, WITH GRAM performed by Selena Rincon MD at [facility] TRANSESOPHAGEAL ECHOCARDIOGRAM  2018    Dr Ingrid Raphael History     Socioeconomic History    Marital status:      Spouse name: Not on file    Number of children: 2    Years of education: Not on file    Highest education level: Not on file   Occupational History    Not on file   Tobacco Use    Smoking status: Former Smoker     Packs/day: 2.00     Types: Cigarettes     Start date: 10/24/1957     Quit date:      Years since quittin.7    Smokeless tobacco: Never Used   Vaping Use    Vaping Use: Never used   Substance and Sexual Activity    Alcohol use: Yes     Comment: rare    Drug use: No    Sexual activity: Not Currently   Other Topics Concern    Not on file   Social History Narrative    Not on file     Social Determinants of Health     Financial Resource Strain:     Difficulty of Paying Living Expenses:    Food Insecurity:     Worried About Running Out of Food in the Last Year:     [address] in the metoclopramide (REGLAN) 5 MG tablet Take 1 tablet by mouth 3 times daily (before meals) 120 tablet 3    losartan-hydrochlorothiazide (HYZAAR) 100-25 MG per tablet Take 1 tablet by mouth daily (Patient taking differently: Take 1 tablet by mouth every morning ) 90 tablet 3     No current facility-administered medications for this encounter. REVIEW OF SYSTEMS:     Constitutional:  No fever, chills or rigors.  Eyes: No changes in vision. No eye discharge or pain   ENT: + Frontal headaches. + Vertigo. No hearing loss. No mouth sores or sore throat. No change in taste or smell.  Cardiovascular: No chest discomfort, dyspnea on exertion, palpitations or loss of consciousness or phlebitis.  Respiratory: No cough or wheezing, Has no sputum production. Has no hemoptysis, has no pleuritic pain.  Gastrointestinal: No abdominal pain, appetite loss, blood in stools. No change in bowel habits. No hematemesis    Genitourinary: Patient acknowledges no dysuria, trouble voiding, or hematuria. No increased frequency.  Musculoskeletal: No gait disturbance, weakness or joint complaints.  Integumentary: No rash or pruritis.  Neurological: No diplopia, change in muscle strength, numbness or tingling. No change in gait, balance, coordination, mood, affect, memory, mentation, behavior.  Psychiatric: No anxiety, or depression.  Endocrine: No temperature intolerance. No excessive thirst, fluid intake, or urination. No tremor.  Hematologic/Lymphatic: No abnormal bruising or bleeding, blood clots or swollen lymph nodes.  Allergic/Immunologic: No nasal congestion or hives. PHYSICAL EXAMINATION:   Vitals:    09/15/21 0922   BP: 120/74   Pulse: 57   Resp: 20   Temp: 96 °F (35.6 °C)   TempSrc: Temporal   SpO2: 95%   Weight: 255 lb 9 oz (115.9 kg)       Body mass index is 41.25 kg/m². Appearance: Well-developed, well-nourished 68year old female. Skin: Warm and dry, no rash or hives.    Head: Normocephalic, atraumatic  Eyes: EOMI, no conjunctival erythema  ENMT: No pharyngeal erythema, MMM, no rhinorrhea. Neck: Supple, no elevated JVP  Lungs: Clear to auscultation bilaterally. No wheezes, rales or rhonchi. Cardiac: Regular rate and rhythm, +S1S2, no murmurs apparent  Abdomen: Soft, round and non-tender. Bowel sounds present x 4. Extremities: Moves all extremities x 4, no lower extremity edema  Neurologic: Alert and oriented x 3. No focal motor deficits apparent         IMAGIN2021: MRI BRAIN W WO Contrast:  HISTORY:   ORDERING SYSTEM PROVIDED HISTORY: Meningioma [facility])   TECHNOLOGIST PROVIDED HISTORY:   Reason for exam:->Recurrent meningioma S/p SRS completed 2021   Reason for exam:->Single sequence 3D T1 post-[place]. Please compare to prior MRI.       FINDINGS:   40 mL MultiHance utilized       Status post bifrontal craniotomies.  Area of encephalomalacia change with   surrounding gliosis associated with left frontal lobe from prior surgery. This finding is stable compared to previous examinations.  Redemonstration of   extra-axial, homogeneously enhancing mass located in anterior aspect of   interhemispheric fissure measures 2 x 2.2 cm and is stable in size compared   to previous examinations.  This lesion is intermediate in intensity on FLAIR   and shows areas of intermediate intensity on T2 weighted imaging.  No new   intracranial mass.  No areas of restricted diffusion to suggest acute   intracranial ischemia.  No acute intracranial hemorrhage.  Irregular foci of   hypointense signal on gradient imaging located in left frontal lobe in area   of encephalomalacia suggesting hemosiderin.  No acute intracranial   hemorrhage.  Multiple foci of increased T2 and FLAIR signal scattered   throughout the subcortical and periventricular white matter of both   hemispheres appears similar compared to previous examinations.  No abnormal   extra-axial fluid collections.  No hydrocephalus.  Mucous retention cyst or   polyp located in right maxillary sinus measures 2 x 1.4 cm.  Mastoid air   cells are clear. IMPRESSION:   1. Status post bifrontal craniotomies with area of encephalomalacia   associated with left frontal lobe. 2. Stable extra-axial mass located in anterior interhemispheric fissure   suggesting stable meningioma. 3. No evidence of acute stroke, edema, or acute intracranial hemorrhage. 4. Redemonstration of moderate burden of nonspecific foci of abnormal signal   scattered throughout the white matter of both hemispheres, yet suggestive of   chronic microvascular ischemia. ASSESSMENT/PLAN:    Recurrent frontal interhemispheric meningioma. Post SRS completion 04/23/2021. Patient is doing well post SRS completion. Post treatment completion MRI of Brain 09/11/2021: stable. Results discussed with patient and her son at today's visit. MRI Brain to be completed between dates of November 11-15, 2021 (ordered). I discussed follow up plans with [address] [place] [address] Oncology follow-up visit November 17, 2021. Jessica Iris instructed to follow up with other physicians involved in their care as directed (including but not limited to Primary Care and Radiation Oncology). The patient was given our contact number in the event that if at any time they change their mind and would like to return to the clinic to see either myself or one of the Radiation Oncologists, they can simply call us and we would be happy to see them. Thank you for involving us in the management of this extremely pleasant patient. More than 25 min was in direct contact with pt coordinating/giving care. >50% of the visit was spent in counseling the pt on the following: Follow up care    The nurses notes were reviewed and incorporated into this assessment and plan.         Bernie Galeazzi, MSN, APRN, CNP  Certified Nurse Practitioner for [address] [place] [address] Ran [phone]/ F: [phone]   Petty [phone] / F: [phone]

## 2021-11-09 ENCOUNTER — TELEPHONE (OUTPATIENT)
Dept: RADIATION ONCOLOGY | Age: 76
End: 2021-11-09

## 2021-11-09 NOTE — TELEPHONE ENCOUNTER
Placed call to [phone] Utilization Management for authorization of MRI Brain with and without contrast. Auth # [id number] effective dates: 11/09/2021-05/08/2022.

## 2021-11-11 ENCOUNTER — HOSPITAL ENCOUNTER (OUTPATIENT)
Age: 76
Discharge: HOME | End: 2021-11-11
Payer: MEDICARE

## 2021-11-11 ENCOUNTER — HOSPITAL ENCOUNTER (OUTPATIENT)
Dept: MRI IMAGING | Age: 76
Discharge: HOME | End: 2021-11-13
Payer: MEDICARE

## 2021-11-11 DIAGNOSIS — D32.9 MENINGIOMA (HCC): ICD-10-CM

## 2021-11-11 DIAGNOSIS — Z92.3 STATUS POST STEREOTACTIC RADIOSURGERY: ICD-10-CM

## 2021-11-11 DIAGNOSIS — Z01.812 BLOOD TESTS PRIOR TO TREATMENT OR PROCEDURE: ICD-10-CM

## 2021-11-11 LAB
BUN BLDV-MCNC: 35 MG/DL (ref 6–23)
CREAT SERPL-MCNC: 1.1 MG/DL (ref 0.5–1)
GFR AFRICAN AMERICAN: 58
GFR NON-AFRICAN AMERICAN: 48 ML/MIN/1.73

## 2021-11-11 PROCEDURE — A9579 GAD-BASE MR CONTRAST NOS,1ML: HCPCS | Performed by: NURSE PRACTITIONER

## 2021-11-11 PROCEDURE — 70553 MRI BRAIN STEM W/O & W/DYE: CPT

## 2021-11-11 PROCEDURE — 82565 ASSAY OF CREATININE: CPT

## 2021-11-11 PROCEDURE — 84520 ASSAY OF UREA NITROGEN: CPT

## 2021-11-11 PROCEDURE — 6360000004 HC RX CONTRAST MEDICATION: Performed by: NURSE PRACTITIONER

## 2021-11-11 PROCEDURE — 36415 COLL VENOUS BLD VENIPUNCTURE: CPT

## 2021-11-11 RX ADMIN — GADOTERIDOL 20 ML: 279.3 INJECTION, SOLUTION INTRAVENOUS at 16:36

## 2021-11-17 ENCOUNTER — HOSPITAL ENCOUNTER (OUTPATIENT)
Dept: RADIATION ONCOLOGY | Age: 76
Discharge: HOME | End: 2021-11-17
Attending: SPECIALIST
Payer: MEDICARE

## 2021-11-17 VITALS
DIASTOLIC BLOOD PRESSURE: 74 MMHG | BODY MASS INDEX: 41.63 KG/M2 | SYSTOLIC BLOOD PRESSURE: 138 MMHG | TEMPERATURE: 97.1 F | RESPIRATION RATE: 18 BRPM | WEIGHT: 257.9 LBS | HEART RATE: 55 BPM | OXYGEN SATURATION: 95 %

## 2021-11-17 DIAGNOSIS — D32.9 MENINGIOMA (HCC): Primary | ICD-10-CM

## 2021-11-17 DIAGNOSIS — Z01.812 BLOOD TESTS PRIOR TO TREATMENT OR PROCEDURE: ICD-10-CM

## 2021-11-17 DIAGNOSIS — Z92.3 STATUS POST STEREOTACTIC RADIOSURGERY: ICD-10-CM

## 2021-11-17 DIAGNOSIS — Z71.2 ENCOUNTER TO DISCUSS TEST RESULTS: ICD-10-CM

## 2021-11-17 PROCEDURE — 99213 OFFICE O/P EST LOW 20 MIN: CPT | Performed by: NURSE PRACTITIONER

## 2021-11-17 PROCEDURE — 99213 OFFICE O/P EST LOW 20 MIN: CPT

## 2021-11-17 NOTE — PROGRESS NOTES
indicated for ambulation activities  4. Reorient confused/cognitively impaired patient  5. Call-light/bell within patient's reach  6. Chair/bed in low position, stretcher/bed with siderails up except when performing patient care activities  7. Educate patient/family/caregiver on falls prevention  8. Falls risk precaution (Yellow sticker Level III) placed on patient chart           MALNUTRITION RISK SCREENING ASSESSMENT    11/17/2021   Patient:  Becky Price  Sex:  female    Instructions:  Assess the patient and enter the appropriate indicators that are present for nutrition risk identification. Total the numbers entered and assign a risk score. Follow the appropriate action for total score listed below. Assessment   Date  11/17/2021     1. Have you lost weight without trying? 0- No     2. Have you been eating poorly because of a decreased appetite? 0- No   3. Do you have a diagnosis of head and neck cancer?       0- No                                                                                    TOTAL 0          Score of 0-1: No action  Score 2 or greater:  · For Non-Diabetic Patient: Recommend adding Ensure Complete 2 x daily and provide patient with Ensure wellness bag with coupons  · For Diabetic Patient: Recommend adding Glucerna Shake 2 x daily and provide patient with Glucerna Wellness bag with coupons  · Route to the dietitian via Ira Luong RN

## 2021-11-17 NOTE — PROGRESS NOTES
Radiation Oncology   3 Month Follow Up Note    11/17/2021    Wilfredo Suazo  Vitals:    11/17/21 0902   BP: 138/74   Pulse: 55   Resp: 18   Temp: 97.1 °F (36.2 °C)   SpO2: 95%     Wt Readings from Last 1 Encounters:   11/17/21 257 lb 14.4 oz (117 kg)       CC: Patient is here for 3 month Radiation Oncology follow-up visit. HPI:  Wilfredo Suazo is a pleasant 68 y.o. female with a with a long time history of recurrent left frontal meningioma. Initially  diagnosed and status post craniotomy in 2003, with recurrence in 2010 status post 2nd craniotomy, with recurrence in 2016, status post 3rd craniotomy with pathology 08/11/2016 WHO Gr I meningioma. The patient continued with surveillance MRI Brain. MRI brain 11/04/2019 showed minimal enlargement of nodular dural thickening likely related to meningioma along the anterior interhemispheric falx from 8 x 6 mm to 12 x 8 mm. No significant mass effect seen. Patient did not complete repeat MRI brain 2020 (unknown reason but suspect not done due to pandemic). The patient completed MRI Brain 02/05/2021 which revealed interval enlargement of the meningioma along the anterior interhemispheric falx measuring 2.5 x 1.9 x 1.9 cm (measured 1.2 x 1.5 x 0.8 cm on prior scan). The meningioma involves the anterior aspect of the superior sagittal sinus and exerts mild mass effect on the paracentral aspects of the frontal lobes. The patient symptomatic with reports of daily headaches 10/10 intensity, slight dizziness with no LOC, R eye disturbance and occasional faint ringing in both ears. The patient referred to Radiation Oncology Dr. Shante Keating for non surgical treatment options (patient declined surgical intervention). The patient seen initial Radiation Oncology consultation on 03/16/2021, patient agreeable to proceed with stereotactic radiosurgery [facility]). The patient completed SRS on 04/23/2021, total dose 2500 cGy/ 5 fractions to interhemispheric.  Post SRS completion MRI of the brain completed 09/11/2021 (due at 3 months but delayed per patient's request for prolonged travel with family). MRI Brain 09/11/2021: status post bifrontal craniotomies with area of encephalomalacia associated with left frontal lobe. Stable extra-axial mass located in anterior interhemispheric fissure suggesting stable meningioma. No evidence of acute stroke, edema or acute intracranial hemorrhage. Redemostration of moderate burden of nonspecific foci of abnormal signal scattered throughout the white matter of both hemispheres, yet suggestive of chronic microvascular ischemia. MRI Brain 11/11/2021: No significant interval change as of 09/11/2021. Postoperative changes from prior meningioma resection along the anterior cerebral convexities. Stable residual or recurrent meningioma along the anterior hemorrhage or hemispheric falx. The meningioma likely invades the anterior aspect of the superior sagittal sinus. The patient is seen today in 3 month Radiation Oncology follow-up visit accompanied by her son Irish into the exam room. The patient reports chronic frontal headaches as improved, less intense, less occurrences. The patient reports episodes of dizziness as less, less occurrences. The patient also notes dizziness episodes usually occur with positional changes. No seizure activity, no vomiting, no behavior changes, no memory loss or confusion. No diplopia, blurred vision or loss of vision. No fevers or chills.        Patient is following with:    Primary care- Dr. Gramajo [facility]     Neurosurgery- Dr. Cristina Mcfarland     Past Medical History:   Diagnosis Date    Anxiety     Arthritis     Back pain     CAD (coronary artery disease)     Cancer ([address]     meningioma X 2    Chest pain     Depression     Gastroesophageal reflux disease without esophagitis 10/21/2016    Headache(784.0)     Heart attack ([address]     Hyperlipidemia     intolerant of statins    Hypertension     Hypotension 10/25/2018    Hypothyroid     Iron deficiency anemia 10/24/2018    Receiving iron infusions hematology [facility]    Meningioma [facility]) [id number]    incomplete resection 2010    Obesity     weight 239    Obstructive jaundice 10/24/2018    Palpitations     Pruritus 10/25/2018    Seizure disorder ([address]     Sinus bradycardia 10/26/2018    Sleep apnea     no machine    Thyroid disease     Hypothyroidism       Past Surgical History:   Procedure Laterality Date    BRAIN MENINGIOMA EXCISION      S/p craniotomy left frontal resection 2003    CARDIOVERSION  2018    DR Lissette Garsia    CHOLECYSTECTOMY      COLONOSCOPY  2015    CRANIOTOMY Left 2016    FRONTAL MENINGIOMA x 3    ERCP N/A 10/26/2018    ERCP STONE REMOVAL performed by Ankur Chanel MD at [address] Bilateral     Cataracts    HYSTERECTOMY      CT COLONOSCOPY FLX DX W/COLLJ SPEC WHEN PFRMD N/A 2018    COLONOSCOPY DIAGNOSTIC performed by Asad Hopkins MD at [address] [place] [address] LAP,CHOLECYSTECTOMY/GRAPH N/A 10/11/2018    LAPAROSCOPIC CHOLECYSTECTOMY, WITH GRAM performed by Asad Hopkins MD at [address] [place] [address] TRANSESOPHAGEAL ECHOCARDIOGRAM  2018    Dr Pablo Bad History     Socioeconomic History    Marital status:      Spouse name: Not on file    Number of children: 2    Years of education: Not on file    Highest education level: Not on file   Occupational History    Not on file   Tobacco Use    Smoking status: Former Smoker     Packs/day: 2.00     Years: 38.00     Pack years: 76.00     Types: Cigarettes     Start date: 10/24/1957     Quit date:      Years since quittin.8    Smokeless tobacco: Never Used   Vaping Use    Vaping Use: Never used   Substance and Sexual Activity    Alcohol use: Not Currently     Comment: rare    Drug use: No    Sexual activity: Not Currently   Other Topics Concern    Not on file Social History Narrative    Not on file     Social Determinants of Health     Financial Resource Strain:     Difficulty of Paying Living Expenses: Not on file   Food Insecurity:     Worried About Running Out of Food in the Last Year: Not on file    Armida of Food in the Last Year: Not on file   Transportation Needs:     Lack of Transportation (Medical): Not on file    Lack of Transportation (Non-Medical):  Not on file   Physical Activity:     Days of Exercise per Week: Not on file    Minutes of Exercise per Session: Not on file   Stress:     Feeling of Stress : Not on file   Social Connections:     Frequency of Communication with Friends and Family: Not on file    Frequency of Social Gatherings with Friends and Family: Not on file    Attends Jewish Services: Not on file    Active Member of Clubs or Organizations: Not on file    Attends Club or Organization Meetings: Not on file    Marital Status: Not on file   Intimate Partner Violence:     Fear of Current or Ex-Partner: Not on file    Emotionally Abused: Not on file    Physically Abused: Not on file    Sexually Abused: Not on file   Housing Stability:     Unable to Pay for Housing in the Last Year: Not on file    Number of [place] in the Last Year: Not on file    Unstable Housing in the Last Year: Not on file       Family History   Problem Relation Age of Onset    Heart Disease Father          age 80    Arrhythmia Mother         a fib    Diabetes Mother          age 80 of heart complications    Breast Cancer Mother     Cancer Sister         BREAST    Cancer Sister         breast       Allergies:   Pcn [penicillins] and Sulfa antibiotics      Current Outpatient Medications   Medication Sig Dispense Refill    buPROPion (WELLBUTRIN XL) 300 MG extended release tablet 150 mg       pravastatin (PRAVACHOL) 40 MG tablet TAKE ONE TABLET BY MOUTH DAILY  0    levothyroxine (SYNTHROID) 150 MCG tablet Take 150 mcg by mouth every thirst, fluid intake, or urination. No tremor.  Hematologic/Lymphatic: No abnormal bruising or bleeding, blood clots or swollen lymph nodes.  Allergic/Immunologic: No nasal congestion or hives. PHYSICAL EXAMINATION:   Vitals:    21 0902   BP: 138/74   Site: Right Upper Arm   Position: Sitting   Cuff Size: Medium Adult   Pulse: 55   Resp: 18   Temp: 97.1 °F (36.2 °C)   TempSrc: Skin   SpO2: 95%   Weight: 257 lb 14.4 oz (117 kg)       Body mass index is 41.63 kg/m². Appearance: Well-developed, well-nourished 68year old female. Skin: Warm and dry, no rash or hives. Head: Normocephalic, atraumatic  Eyes: EOMI, no conjunctival erythema  ENMT: No pharyngeal erythema, MMM, no rhinorrhea. Neck: Supple, no elevated JVP  Lungs: Clear to auscultation bilaterally. No wheezes, rales or rhonchi. Cardiac: Regular rate and rhythm, +S1S2, no murmurs apparent  Abdomen: Soft, round and non-tender. Bowel sounds present x 4. Extremities: Moves all extremities x 4, no lower extremity edema  Neurologic: Alert and oriented x 3. No focal motor deficits apparent         IMAGIN2021 MRI Brain W WO Contrast: (ordering provider CATIE Trevino for Radiation Oncology)   FINDINGS:   INTRACRANIAL STRUCTURES/VENTRICLES: Marquetta Gosselin is no acute infarct. Status post   bifrontal craniotomies for resection of meningiomas. Marquetta Gosselin is a stable   residual/recurrent meningioma along the anterior interhemispheric falx.  The   meningioma measures approximately 3.3 x 1.9 x 2.1 cm.  The meningioma is   indistinguishable from the superior sagittal vein in this region, which is   likely invaded by the meningioma.       There is stable gliosis along the anterior aspect of the left frontal lobe.    The remainder of the brain is notable for mild chronic microvascular ischemic   changes.  Outside of the operative field, no significant volume loss is seen   in the brain.  No hydrocephalus or extra-axial fluid is seen.  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       ORBITS: Prosthetic lenses are seen in the globes bilaterally.  The orbits are   otherwise grossly unremarkable.       SINUSES:  A mucous retention cyst is seen in the right maxillary sinus.  Mild   mucosal thickening is seen in the paranasal sinuses.  Trace fluid in the   mastoid air cells.       BONES/SOFT TISSUES: Postoperative changes from prior bifrontal craniotomies. No acute calvarial abnormality seen.  No marrow signal abnormality. IMPRESSION:   1. No significant interval change as of 09/11/2021.   2. Postoperative changes from prior meningioma resection along the anterior   cerebral convexities. 3. Stable residual or recurrent meningioma along the anterior hemorrhage or   hemispheric falx. 4. The meningioma likely invades the anterior aspect of the superior sagittal   sinus. ASSESSMENT/PLAN:    Recurrent frontal interhemispheric meningioma. Post SRS completion 04/23/2021. Patient is doing well post SRS completion. MRI brain 11/11/2021 (see above) with no significant loving. Stable. Results discussed with patient and her son during today's visit. NCCN Guidelines Meningiomas version 2.2021 follow-up recommendations WHO Gr 1 or 2 or unresected meningiomas: Brain MRI at 3, 6 and 12 mo, then every 6-12 mo for 5 y, then every 1-3 y as clinically indicated. MRI brain to be completed between the dates of April 22-29, 2022 (ordered). I discussed follow up plans with [address] [place] [address] Oncology follow-up visit May 2, 2022 at 9:00 AM.     Will Chanel instructed to follow up with other physicians involved in their care as directed (including but not limited to Radiation Oncology, Neurosurgery and Primary Care).      The patient was given our contact number in the event that if at any time they change their mind and would like to return to the clinic

## 2021-12-06 ENCOUNTER — TELEPHONE (OUTPATIENT)
Dept: PHARMACY | Facility: CLINIC | Age: 76
End: 2021-12-06

## 2021-12-06 NOTE — TELEPHONE ENCOUNTER
[facility] CLINICAL PHARMACY REVIEW: ADHERENCE REVIEW  Identified care gap per [employer]; fills at [facility]: ACE/ARB and Statin adherence    Last Visit: PCP appears to be an affiliate provider    Patient found in Outcomes MTM and is currently eligible for TIP    ASSESSMENT  ACE/ARB ADHERENCE    Per Insurance Records through 11/13/21 (2020 [place] = 94%; YTD [place] = 94%; Passed in 2021):   LOSARTAN/HCT -25 last filled on 10/20/21 for 90 day supply. Next refill due: 1/18/21        BP Readings from Last 3 Encounters:   11/17/21 138/74   09/15/21 120/74   05/26/21 116/78     Estimated Creatinine Clearance: 57 mL/min (A) (based on SCr of 1.1 mg/dL (H)). [facility]    Per Insurance Records through 11/13/21 (2020 [place] = 89%; YTD [place] = 80%; Potential Fail Date: 12/12/21):   PRAVASTATIN  TAB 40MG last filled on 9/3/21 for 90 day supply. Next refill due: 12/2/21    Per Reconciled Dispense Report:  PRAVASTATIN  TAB 40MG last filled on 9/3/21 for 90 day supply. Per [facility]:   PRAVASTATIN  TAB 40MG last picked up on 9/6/21 for 90 day supply. 0 refills remaining. Billed through [employer]     Per Pharmacy Dr. Amie Pina does not take pharmacy request and prefers patient to call when out of refills    12/08/2021 medication will be ready for  after 3pm    Lab Results   Component Value Date    CHOL 261 (H) 11/02/2018    TRIG 147 11/02/2018    HDL 46 11/02/2018    LDLCALC 186 (H) 11/02/2018     ALT   Date Value Ref Range Status   11/12/2018 53 (H) 0 - 32 U/L Final     AST   Date Value Ref Range Status   11/12/2018 57 (H) 0 - 31 U/L Final     The ASCVD Risk score (Edgardo Solano., et al., 2013) failed to calculate for the following reasons:    Cannot find a previous HDL lab    Cannot find a previous total cholesterol lab     PLAN  The following are interventions that have been identified:   - Patient overdue refilling Pravastatin and active on home medication list.     Spoke to patient.  Let her know that she is due for a refill on her pravastatin. Patient claimed she thinks she just picked up. I educated her that her last fill was 21 so she is due for a refill today. Explained that she has no refills remaining and per the pharmacy Dr. Princess Jordan prefers patients to call the office for refills. Patient verbalized understanding and will call for refills. Refill request sent in and will be ready to  on 2021 after [address] in place:  No   Recommendation Provided To: Patient/Caregiver: 1 via Telephone and Pharmacy: 1   Intervention Detail: Adherence Monitorin and Refill(s) Provided   Gap Closed?: Yes    Intervention Accepted By: Patient/Caregiver: 1   Time Spent (min): 15          Future Appointments   Date Time Provider Leanna Ochoa   2022  9:00 AM LUIS Murrieta - CNP [facility].    Located within [facility] free: [phone]

## 2022-07-15 ENCOUNTER — HOSPITAL ENCOUNTER (OUTPATIENT)
Dept: RADIATION ONCOLOGY | Age: 77
Discharge: HOME | End: 2022-07-15
Attending: SPECIALIST
Payer: COMMERCIAL

## 2022-07-15 VITALS
OXYGEN SATURATION: 97 % | BODY MASS INDEX: 41.66 KG/M2 | SYSTOLIC BLOOD PRESSURE: 128 MMHG | TEMPERATURE: 97.2 F | RESPIRATION RATE: 18 BRPM | WEIGHT: 258.1 LBS | DIASTOLIC BLOOD PRESSURE: 88 MMHG | HEART RATE: 54 BPM

## 2022-07-15 DIAGNOSIS — D32.9 MENINGIOMA (HCC): Primary | ICD-10-CM

## 2022-07-15 DIAGNOSIS — Z92.3 STATUS POST STEREOTACTIC RADIOSURGERY: ICD-10-CM

## 2022-07-15 DIAGNOSIS — Z01.812 BLOOD TESTS PRIOR TO TREATMENT OR PROCEDURE: ICD-10-CM

## 2022-07-15 PROCEDURE — 99213 OFFICE O/P EST LOW 20 MIN: CPT

## 2022-07-15 PROCEDURE — 99213 OFFICE O/P EST LOW 20 MIN: CPT | Performed by: NURSE PRACTITIONER

## 2022-07-15 NOTE — PROGRESS NOTES
Radiation Oncology   6 Month Follow Up Note  07/15/2022     Ron Garsia  Vitals:    07/15/22 0910   BP: 128/88   Pulse: 54   Resp: 18   Temp: 97.2 °F (36.2 °C)   SpO2: 97%     Wt Readings from Last 1 Encounters:   07/15/22 258 lb 1.6 oz (117.1 kg)       CC: Patient is here for 6 month Radiation Oncology follow-up visit. HPI:  Ron Garsia is a pleasant 68 y.o. female with a long time history of recurrent left frontal meningioma. Initially diagnosed and status post craniotomy in 2003, with recurrence in 2010 status post 2nd craniotomy, with recurrence in 2016, status post 3rd craniotomy with pathology 08/11/2016 WHO Gr I meningioma. The patient continued with surveillance MRI Brain:     11/04/2019 MRI BRAIN: showed minimal enlargement of nodular dural thickening likely related to meningioma along the anterior interhemispheric falx from 8 x 6 mm to 12 x 8 mm. No significant mass effect seen. 2020 MRI BRAIN: Not completed for unknown reason but suspected not done due to pandemic.    02/05/2021 MRI BRAIN: revealed interval enlargement of the meningioma along the anterior interhemispheric falx measuring 2.5 x 1.9 x 1.9 cm (measured 1.2 x 1.5 x 0.8 cm on prior scan). The meningioma involves the anterior aspect of the superior sagittal sinus and exerts mild mass effect on the paracentral aspects of the frontal lobes. The patient symptomatic with reports of daily headaches 10/10 intensity, slight dizziness with no LOC,  right eye disturbance and occasional faint ringing in both ears. The patient declined surgical intervention of recurrent meningioma. The patient referred to Radiation Oncology, Dr. Carlitos Smith for evaluation of stereotactic radiosurgery [facility]. Initial Radiation Oncology consultation on 03/16/2021, patient agreeable to proceed with stereotactic radiosurgery. The patient completed SRS on 04/23/2021, total dose 2500 cGy/ 5 fractions to interhemispheric.      Post SRS completion MRI BRAIN (recommended to be obtained at 3  months, delayed per  patient's request for prolonged travel with family). 09/11/2021 MRI BRAIN: status post bifrontal craniotomies with area of encephalomalacia associated with left frontal lobe. Stable extra-axial mass located in anterior interhemispheric fissure suggesting stable meningioma. No evidence of acute stroke, edema or acute intracranial hemorrhage. Redemostration of moderate burden of nonspecific foci of abnormal signal scattered throughout the white matter of both hemispheres, yet suggestive of chronic microvascular ischemia. 11/11/2021 MRI BRAIN: No significant interval change as of 09/11/2021. Postoperative changes from prior meningioma resection along the anterior cerebral convexities. Stable residual or recurrent meningioma along the anterior hemorrhage or hemispheric falx. The meningioma likely invades the anterior aspect of the superior sagittal sinus. The patient last seen in 3 month Radiation Oncology follow-up visit on 11/17/2021. The patient was doing well at that time and planned for repeat MRI Brain end of 04/2022 with follow-up visit 05/02/2022. Today the patient is seen in 6 month Radiation Oncology follow-up visit. The patient informs me she was sick with bronchitis during the time the MRI Brain was to be scheduled, she cancelled 05/02/2022 due to illness- rescheduled for today. The patient informs me she tried to call central scheduling several times to schedule her MRI BRAIN but was unsuccessful. Bronchitis treated per PCP from outpatient status. The patient with known chronic frontal headaches reported as ongoing, but less than before (previously occurred daily, then weekly at present occurs monthly). The patient reports occasional episodes of dizziness with positional changes also ongoing but less than before. The patient denies seizure activity, vomiting, behavior changes or confusion. No new vision disturbances.  Known hearing loss, wears hearing aide. Previously reported intermittent episodes of tinnitus, now resolved. No unilateral weakness. No fevers or chills. The patient informs me she is traveling with her children and their families. First trip to [facility] leaving tomorrow from there she will be traveling to [place] for extended stay till end of the summer. Plans to return first week of September 2022. Patient is following with:    Neurosurgery- Dr. Petar Benjamin- Dr. Brennan Apt.      Past Medical History:   Diagnosis Date    Anxiety     Arthritis     Back pain     CAD (coronary artery disease)     Cancer (HCC)     Recurrent meningioma    Chest pain     Depression     Gastroesophageal reflux disease without esophagitis 10/21/2016    Headache(784.0)     Heart attack ([address]     Hyperlipidemia     intolerant of statins    Hypertension     Hypotension 10/25/2018    Hypothyroid     Iron deficiency anemia 10/24/2018    Receiving iron infusions hematology [address]    Meningioma [facility]) 2458,[id number]    incomplete resection 2010    Obesity     weight 239    Obstructive jaundice 10/24/2018    Palpitations     Pruritus 10/25/2018    Seizure disorder ([facility] [address]     Sinus bradycardia 10/26/2018    Sleep apnea     no machine    Thyroid disease     Hypothyroidism         Past Surgical History:   Procedure Laterality Date    BRAIN MENINGIOMA EXCISION      S/p craniotomy left frontal resection 2003    CARDIOVERSION  09/06/2018    DR Hammer    CHOLECYSTECTOMY      COLONOSCOPY  12/16/2015    CRANIOTOMY Left 08/11/2016    FRONTAL MENINGIOMA x3  (2003, 2010 + 2016)     ERCP N/A 10/26/2018    ERCP STONE REMOVAL performed by Phyllis Welsh MD at [address] Bilateral     Cataracts    HYSTERECTOMY (CERVIX STATUS UNKNOWN)      [place] COLONOSCOPY FLX DX W/COLLJ SPEC WHEN PFRMD N/A 6/11/2018    COLONOSCOPY DIAGNOSTIC performed by Meghan Orozco MD at [address],CHOLECYSTECTOMY/GRAPH N/A 10/11/2018    LAPAROSCOPIC CHOLECYSTECTOMY, WITH GRAM performed by Kindra Dial MD at [address] [place]      TRANSESOPHAGEAL ECHOCARDIOGRAM  2018    Dr Omar Corcoran History     Socioeconomic History    Marital status:      Spouse name: Not on file    Number of children: 2    Years of education: Not on file    Highest education level: Not on file   Occupational History    Not on file   Tobacco Use    Smoking status: Former     Packs/day: 2.00     Years: 38.00     Pack years: 76.00     Types: Cigarettes     Start date: 10/24/1957     Quit date:      Years since quittin.5    Smokeless tobacco: Never   Vaping Use    Vaping Use: Never used   Substance and Sexual Activity    Alcohol use: Not Currently     Comment: rare    Drug use: No    Sexual activity: Not Currently   Other Topics Concern    Not on file   Social History Narrative    Not on file     Social Determinants of Health     Financial Resource Strain: Not on file   Food Insecurity: Not on file   Transportation Needs: Not on file   Physical Activity: Not on file   Stress: Not on file   Social Connections: Not on file   Intimate Partner Violence: Not on file   Housing Stability: Not on file         Family History   Problem Relation Age of Onset    Heart Disease Father          age 80    Arrhythmia Mother         a fib    Diabetes Mother          age 80 of heart complications    Breast Cancer Mother     Cancer Sister         BREAST    Cancer Sister         breast       Allergies:   Pcn [penicillins] and Sulfa antibiotics      Current Outpatient Medications   Medication Sig Dispense Refill    buPROPion (WELLBUTRIN XL) 300 MG extended release tablet 150 mg       pravastatin (PRAVACHOL) 40 MG tablet TAKE ONE TABLET BY MOUTH DAILY  0    levothyroxine (SYNTHROID) 150 MCG tablet Take 150 mcg by mouth every morning      apixaban (ELIQUIS) 5 MG TABS tablet Take 1 tablet by mouth 2 times daily Resume medication in 48 hours (Patient taking differently: Take 5 mg by mouth in the morning and 5 mg before bedtime.) 2 tablet 0    acetaminophen (TYLENOL) 325 MG tablet Take 650 mg by mouth every 6 hours as needed for Pain      FLUoxetine (PROZAC) 20 MG capsule Take 40 mg by mouth every morning       omeprazole (PRILOSEC) 20 MG delayed release capsule Take 20 mg by mouth daily as needed       metoprolol succinate (TOPROL XL) 25 MG extended release tablet Take 1 tablet by mouth daily (Patient taking differently: Take 12.5 mg by mouth every morning) 30 tablet 3    losartan-hydrochlorothiazide (HYZAAR) 100-25 MG per tablet Take 1 tablet by mouth daily (Patient taking differently: Take 1 tablet by mouth every morning) 90 tablet 3     No current facility-administered medications for this encounter. REVIEW OF SYSTEMS:    Constitutional:  No fever, chills or rigors. Eyes: No changes in vision. No eye discharge or pain  ENT: + Chronic frontal headaches (less). No hearing loss or vertigo. No mouth sores or sore throat. No change in taste or smell. Cardiovascular: No chest discomfort, palpitations, loss of consciousness or phlebitis. Respiratory: No dyspnea. No productive cough or hemoptysis. No wheezing or pleuritic pain. Gastrointestinal: No abdominal pain, appetite loss, blood in stools. No change in bowel habits. No hematemesis   Genitourinary: Patient acknowledges no dysuria, trouble voiding, or hematuria. No increased frequency. Musculoskeletal: + Walks with cane. No weakness or joint complaints. Integumentary: No rash or pruritis. Neurological: No headache, diplopia, change in muscle strength, numbness or tingling. No change in gait, balance, coordination, mood, affect, memory, mentation, behavior. Psychiatric: No anxiety, or depression. Endocrine: No temperature intolerance. No excessive thirst, fluid intake, or urination. No tremor.    Hematologic/Lymphatic: No abnormal bruising or bleeding, blood clots or swollen lymph nodes. Allergic/Immunologic: No nasal congestion or hives. PHYSICAL EXAMINATION:   Vitals:    07/15/22 0910   BP: 128/88   Pulse: 54   Resp: 18   Temp: 97.2 °F (36.2 °C)   TempSrc: Skin   SpO2: 97%   Weight: 258 lb 1.6 oz (117.1 kg)       Body mass index is 41.66 kg/m². Appearance: Well-developed, well-nourished 68year old female. Skin: Warm and dry, no rashes or hives. Head: Normocephalic, atraumatic  Eyes: EOMI, no conjunctival erythema  ENMT: No pharyngeal erythema, MMM, no rhinorrhea. Neck: Supple, no elevated JVP  Lungs: Clear to auscultation bilaterally. No wheezes, rales or rhonchi. Cardiac: Regular rate and rhythm, +S1S2, no murmurs apparent  Abdomen: Soft, round and non-tender. Bowel sounds present x 4. Extremities: Moves all extremities x 4, no lower extremity edema  Neurologic: Alert and oriented x 3. No focal motor deficits apparent         IMAGING:    No new imaging     ASSESSMENT/PLAN:    Recurrent frontal interhemispheric meningioma. S/p SRS completion 04/23/2021. Patient is doing well post SRS completion. No new or worsening symptoms. MRI BRAIN not completed as ordered due to patient's illness at that time and difficulty in attempt to reschedule. Planned to reorder MRI BRAIN to be completed in this week or next week. The patient informs me she is traveling leaves tomorrow first to [facility] then to [place], she is not returning to beginning of September and wishes to have MRI BRAIN deferred till that time. Review NCCN guidelines Meningiomas follow-up recommendations discussed WHO Gr 1 or 2 unresected meningiomas recommended for MRI BRAIN every 6-12 for year 2-5. MRI BRAIN 1 year to be completed between the dates of September 5-13, 2022 (ordered).      I discussed follow up plans with Wilfredo Suazo.  Radiation Oncology follow-up visit scheduled September 16, 2022 at 9:00 AM.     Wilfredo Suazo instructed to follow up with other

## 2022-07-15 NOTE — PROGRESS NOTES
Ander Celaya  7/15/2022  9:12 AM      Vitals:    07/15/22 0910   BP: 128/88   Pulse: 54   Resp: 18   Temp: 97.2 °F (36.2 °C)   SpO2: 97%    : Wt Readings from Last 3 Encounters:   07/15/22 258 lb 1.6 oz (117.1 kg)   11/17/21 257 lb 14.4 oz (117 kg)   09/15/21 255 lb 9 oz (115.9 kg)                Current Outpatient Medications:     buPROPion (WELLBUTRIN XL) 300 MG extended release tablet, 150 mg , Disp: , Rfl:     pravastatin (PRAVACHOL) 40 MG tablet, TAKE ONE TABLET BY MOUTH DAILY, Disp: , Rfl: 0    levothyroxine (SYNTHROID) 150 MCG tablet, Take 150 mcg by mouth every morning, Disp: , Rfl:     apixaban (ELIQUIS) 5 MG TABS tablet, Take 1 tablet by mouth 2 times daily Resume medication in 48 hours (Patient taking differently: Take 5 mg by mouth 2 times daily ), Disp: 2 tablet, Rfl: 0    acetaminophen (TYLENOL) 325 MG tablet, Take 650 mg by mouth every 6 hours as needed for Pain, Disp: , Rfl:     FLUoxetine (PROZAC) 20 MG capsule, Take 40 mg by mouth every morning , Disp: , Rfl:     omeprazole (PRILOSEC) 20 MG delayed release capsule, Take 20 mg by mouth daily as needed , Disp: , Rfl:     metoprolol succinate (TOPROL XL) 25 MG extended release tablet, Take 1 tablet by mouth daily (Patient taking differently: Take 12.5 mg by mouth every morning ), Disp: 30 tablet, Rfl: 3    losartan-hydrochlorothiazide (HYZAAR) 100-25 MG per tablet, Take 1 tablet by mouth daily (Patient taking differently: Take 1 tablet by mouth every morning ), Disp: 90 tablet, Rfl: 3      Patient is seen today in follow up with Luis Alfredo Paulson NP. She received SRS interhemispheric 4/14/21-4/23/21 5FX/ [id number] and she missed her CT scan that was ordered for her because she was sick. She is going to reschedule her scan after vacation. She does experience headaches but is self treating with Tylenol. FALLS RISK SCREENING ASSESSMENT    Instructions:  Assess the patient and enter the appropriate indicators that are present for fall risk identification. Total the numbers entered and assign a fall risk score from Table 2.  Reassess patient at a minimum every 12 weeks or with status change. Assessment   Date  7/15/2022     1. Mental Ability: confusion/cognitively impaired No - 0       2. Elimination Issues: incontinence, frequency No - 0       3. Ambulatory: use of assistive devices (walker, cane, off-loading devices), attached to equipment (IV pole, oxygen) Yes - 2     4. Sensory Limitations: dizziness, vertigo, impaired vision No - 0       5. Age 72 years or greater - 1       10. Medication: diuretics, strong analgesics, hypnotics, sedatives, antihypertensive agents   Yes - 3   7. Falls:  recent history of falls within the last 3 months (not to include slipping or tripping)   Yes - 7   TOTAL 13    If score of 4 or greater was education given? Yes       TABLE 2   Risk Score Risk Level Plan of Care   0-3 Little or  No Risk 1. Provide assistance as indicated for ambulation activities  2. Reorient confused/cognitively impaired patient  3. Call-light/bell within patient's reach  4. Chair/bed in low position, stretcher/bed with siderails up except when performing patient care activities  5. Educate patient/family/caregiver on falls prevention  6.  Reassess in 12 weeks or with any noted change in patient condition which places them at a risk for a fall   4-6 Moderate Risk 1. Provide assistance as indicated for ambulation activities  2. Reorient confused/cognitively impaired patient  3. Call-light/bell within patient's reach  4. Chair/bed in low position, stretcher/bed with siderails up except when performing patient care activities  5. Educate patient/family/caregiver on falls prevention  6. Falls risk precaution (Yellow sticker Level II) placed on patient chart   7 or   Higher High Risk 1. Place patient in easily observable treatment room  2. Patient attended at all times by family member or staff  3.   Provide assistance as indicated for ambulation activities  4. Reorient confused/cognitively impaired patient  5. Call-light/bell within patient's reach  6. Chair/bed in low position, stretcher/bed with siderails up except when performing patient care activities  7. Educate patient/family/caregiver on falls prevention  8. Falls risk precaution (Yellow sticker Level III) placed on patient chart           MALNUTRITION RISK SCREENING ASSESSMENT    7/15/2022   Patient:  Clayton Mancia  Sex:  female    Instructions:  Assess the patient and enter the appropriate indicators that are present for nutrition risk identification. Total the numbers entered and assign a risk score. Follow the appropriate action for total score listed below. Assessment   Date  7/15/2022     Have you lost weight without trying? 0- No     Have you been eating poorly because of a decreased appetite? 0- No   3. Do you have a diagnosis of head and neck cancer? 0- No                                                                                    TOTAL 0          Score of 0-1: No action  Score 2 or greater:   For Non-Diabetic Patient: Recommend adding Ensure Complete 2 x daily and provide patient with Ensure wellness bag with coupons  For Diabetic Patient: Recommend adding Glucerna Shake 2 x daily and provide patient with Glucerna Wellness bag with coupons  Route to the dietitian via Hayden Leon RN

## 2022-08-04 ENCOUNTER — HOSPITAL ENCOUNTER (EMERGENCY)
Age: 77
Discharge: HOME | End: 2022-08-04
Attending: EMERGENCY MEDICINE
Payer: COMMERCIAL

## 2022-08-04 ENCOUNTER — APPOINTMENT (OUTPATIENT)
Dept: GENERAL RADIOLOGY | Age: 77
End: 2022-08-04
Payer: COMMERCIAL

## 2022-08-04 VITALS
TEMPERATURE: 96.3 F | HEART RATE: 57 BPM | SYSTOLIC BLOOD PRESSURE: 158 MMHG | OXYGEN SATURATION: 98 % | WEIGHT: 252 LBS | BODY MASS INDEX: 39.55 KG/M2 | RESPIRATION RATE: 16 BRPM | HEIGHT: 67 IN | DIASTOLIC BLOOD PRESSURE: 72 MMHG

## 2022-08-04 DIAGNOSIS — R07.9 CHEST PAIN, UNSPECIFIED TYPE: Primary | ICD-10-CM

## 2022-08-04 LAB
ALBUMIN SERPL-MCNC: 3.5 G/DL (ref 3.5–5.2)
ALP BLD-CCNC: 121 U/L (ref 35–104)
ALT SERPL-CCNC: 9 U/L (ref 0–32)
ANION GAP SERPL CALCULATED.3IONS-SCNC: 10 MMOL/L (ref 7–16)
AST SERPL-CCNC: 12 U/L (ref 0–31)
BASOPHILS ABSOLUTE: 0.02 E9/L (ref 0–0.2)
BASOPHILS RELATIVE PERCENT: 0.3 % (ref 0–2)
BILIRUB SERPL-MCNC: 0.6 MG/DL (ref 0–1.2)
BUN BLDV-MCNC: 21 MG/DL (ref 6–23)
CALCIUM SERPL-MCNC: 8.5 MG/DL (ref 8.6–10.2)
CHLORIDE BLD-SCNC: 99 MMOL/L (ref 98–107)
CO2: 26 MMOL/L (ref 22–29)
CREAT SERPL-MCNC: 1 MG/DL (ref 0.5–1)
EKG ATRIAL RATE: 51 BPM
EKG P-R INTERVAL: 148 MS
EKG Q-T INTERVAL: 460 MS
EKG QRS DURATION: 92 MS
EKG QTC CALCULATION (BAZETT): 423 MS
EKG R AXIS: 0 DEGREES
EKG T AXIS: 15 DEGREES
EKG VENTRICULAR RATE: 51 BPM
EOSINOPHILS ABSOLUTE: 0.11 E9/L (ref 0.05–0.5)
EOSINOPHILS RELATIVE PERCENT: 1.9 % (ref 0–6)
GFR AFRICAN AMERICAN: >60
GFR NON-AFRICAN AMERICAN: 54 ML/MIN/1.73
GLUCOSE BLD-MCNC: 92 MG/DL (ref 74–99)
HCT VFR BLD CALC: 40.8 % (ref 34–48)
HEMOGLOBIN: 13.5 G/DL (ref 11.5–15.5)
IMMATURE GRANULOCYTES #: 0.02 E9/L
IMMATURE GRANULOCYTES %: 0.3 % (ref 0–5)
LIPASE: 21 U/L (ref 13–60)
LYMPHOCYTES ABSOLUTE: 1.3 E9/L (ref 1.5–4)
LYMPHOCYTES RELATIVE PERCENT: 22.1 % (ref 20–42)
MCH RBC QN AUTO: 29.7 PG (ref 26–35)
MCHC RBC AUTO-ENTMCNC: 33.1 % (ref 32–34.5)
MCV RBC AUTO: 89.9 FL (ref 80–99.9)
MONOCYTES ABSOLUTE: 0.48 E9/L (ref 0.1–0.95)
MONOCYTES RELATIVE PERCENT: 8.1 % (ref 2–12)
NEUTROPHILS ABSOLUTE: 3.96 E9/L (ref 1.8–7.3)
NEUTROPHILS RELATIVE PERCENT: 67.3 % (ref 43–80)
PDW BLD-RTO: 13.2 FL (ref 11.5–15)
PLATELET # BLD: 180 E9/L (ref 130–450)
PMV BLD AUTO: 12.6 FL (ref 7–12)
POTASSIUM REFLEX MAGNESIUM: 3.9 MMOL/L (ref 3.5–5)
RBC # BLD: 4.54 E12/L (ref 3.5–5.5)
SODIUM BLD-SCNC: 135 MMOL/L (ref 132–146)
TOTAL PROTEIN: 6 G/DL (ref 6.4–8.3)
TROPONIN, HIGH SENSITIVITY: 7 NG/L (ref 0–9)
WBC # BLD: 5.9 E9/L (ref 4.5–11.5)

## 2022-08-04 PROCEDURE — 85025 COMPLETE CBC W/AUTO DIFF WBC: CPT

## 2022-08-04 PROCEDURE — 84484 ASSAY OF TROPONIN QUANT: CPT

## 2022-08-04 PROCEDURE — 83690 ASSAY OF LIPASE: CPT

## 2022-08-04 PROCEDURE — 71046 X-RAY EXAM CHEST 2 VIEWS: CPT

## 2022-08-04 PROCEDURE — 93005 ELECTROCARDIOGRAM TRACING: CPT | Performed by: PHYSICIAN ASSISTANT

## 2022-08-04 PROCEDURE — 80053 COMPREHEN METABOLIC PANEL: CPT

## 2022-08-04 PROCEDURE — 99285 EMERGENCY DEPT VISIT HI MDM: CPT

## 2022-08-04 ASSESSMENT — PAIN - FUNCTIONAL ASSESSMENT
PAIN_FUNCTIONAL_ASSESSMENT: NONE - DENIES PAIN

## 2022-08-04 NOTE — ED NOTES
Discharge instructions given, medications and follow up instructions reviewed. Patient verbalized understanding, no other noted or stated problems at this time. Patient will follow up with physicians as directed.         Cody Amin RN  08/04/22 [id number]

## 2022-08-04 NOTE — ED PROVIDER NOTES
history that includes Brain meningioma excision; Hysterectomy; craniotomy (Left, 08/11/2016); Tonsillectomy and adenoidectomy; Colonoscopy (12/16/2015); eye surgery (Bilateral); Upper gastrointestinal endoscopy; pr colonoscopy flx dx w/collj spec when pfrmd (N/A, 6/11/2018); transesophageal echocardiogram (09/06/2018); Cardioversion (09/06/2018); pr lap,cholecystectomy/graph (N/A, 10/11/2018); ERCP (N/A, 10/26/2018); and Cholecystectomy. Social History:  reports that she quit smoking about 27 years ago. Her smoking use included cigarettes. She started smoking about 64 years ago. She has a 76.00 pack-year smoking history. She has never used smokeless tobacco. She reports that she does not currently use alcohol. She reports that she does not use drugs. Family History: family history includes Arrhythmia in her mother; Breast Cancer in her mother; Cancer in her sister and sister; Diabetes in her mother; Heart Disease in her father. . Unless otherwise noted, family history is non contributory    The patients home medications have been reviewed. Allergies: Pcn [penicillins] and Sulfa antibiotics        ---------------------------------------------------PHYSICAL EXAM--------------------------------------    Constitutional/General: Alert and oriented x3  Head: Normocephalic and atraumatic  Eyes: PERRL, EOMI, sclera non icteric  Mouth: Oropharynx clear, handling secretions, no trismus, no asymmetry of the posterior oropharynx or uvular edema  Neck: Supple, full ROM, no stridor, no meningeal signs  Respiratory: Lungs clear to auscultation bilaterally, no wheezes, rales, or rhonchi. Not in respiratory distress  Cardiovascular:  Regular rate. Regular rhythm. No murmurs, no aortic murmurs, no gallops, or rubs. 2+ distal pulses. Equal extremity pulses. Chest: No chest wall tenderness  GI:  Abdomen Soft, Non tender, Non distended. No rebound, guarding, or rigidity. No pulsatile masses.   Musculoskeletal: Moves all extremities x 4. Warm and well perfused, no clubbing, cyanosis, or edema. Capillary refill <3 seconds  Integument: skin warm and dry. No rashes. Neurologic: GCS 15, no focal deficits, symmetric strength 5/5 in the upper and lower extremities bilaterally  Psychiatric: Normal Affect          -------------------------------------------------- RESULTS -------------------------------------------------  I have personally reviewed all laboratory and imaging results for this patient. Results are listed below.      LABS: (Lab results interpreted by me)  Results for orders placed or performed during the hospital encounter of 08/04/22   CBC with Auto Differential   Result Value Ref Range    WBC 5.9 4.5 - 11.5 E9/L    RBC 4.54 3.50 - 5.50 E12/L    Hemoglobin 13.5 11.5 - 15.5 g/dL    Hematocrit 40.8 34.0 - 48.0 %    MCV 89.9 80.0 - 99.9 fL    MCH 29.7 26.0 - 35.0 pg    MCHC 33.1 32.0 - 34.5 %    RDW 13.2 11.5 - 15.0 fL    Platelets 683 999 - 669 E9/L    MPV 12.6 (H) 7.0 - 12.0 fL    Neutrophils % 67.3 43.0 - 80.0 %    Immature Granulocytes % 0.3 0.0 - 5.0 %    Lymphocytes % 22.1 20.0 - 42.0 %    Monocytes % 8.1 2.0 - 12.0 %    Eosinophils % 1.9 0.0 - 6.0 %    Basophils % 0.3 0.0 - 2.0 %    Neutrophils Absolute 3.96 1.80 - 7.30 E9/L    Immature Granulocytes # 0.02 E9/L    Lymphocytes Absolute 1.30 (L) 1.50 - 4.00 E9/L    Monocytes Absolute 0.48 0.10 - 0.95 E9/L    Eosinophils Absolute 0.11 0.05 - 0.50 E9/L    Basophils Absolute 0.02 0.00 - 0.20 E9/L   Comprehensive Metabolic Panel w/ Reflex to MG   Result Value Ref Range    Sodium 135 132 - 146 mmol/L    Potassium reflex Magnesium 3.9 3.5 - 5.0 mmol/L    Chloride 99 98 - 107 mmol/L    CO2 26 22 - 29 mmol/L    Anion Gap 10 7 - 16 mmol/L    Glucose 92 74 - 99 mg/dL    BUN 21 6 - 23 mg/dL    Creatinine 1.0 0.5 - 1.0 mg/dL    GFR Non-African American 54 >=60 mL/min/1.73    GFR African American >60     Calcium 8.5 (L) 8.6 - 10.2 mg/dL    Total Protein 6.0 (L) 6.4 - 8.3 g/dL Albumin 3.5 3.5 - 5.2 g/dL    Total Bilirubin 0.6 0.0 - 1.2 mg/dL    Alkaline Phosphatase 121 (H) 35 - 104 U/L    ALT 9 0 - 32 U/L    AST 12 0 - 31 U/L   Troponin   Result Value Ref Range    Troponin, High Sensitivity 7 0 - 9 ng/L   Lipase   Result Value Ref Range    Lipase 21 13 - 60 U/L   EKG 12 Lead   Result Value Ref Range    Ventricular Rate 51 BPM    Atrial Rate 51 BPM    P-R Interval 148 ms    QRS Duration 92 ms    Q-T Interval 460 ms    QTc Calculation (Bazett) 423 ms    R Axis 0 degrees    T Axis 15 degrees   ,       RADIOLOGY:  Interpreted by Radiologist unless otherwise specified  XR CHEST (2 VW)   Final Result   No acute cardiopulmonary process. EKG Interpretation  Interpreted by emergency department physician, Dr. Connor Hines    Date of EK22  Time: 1116    Rhythm: sinus bradycardia  Rate: 51  Axis: normal  Conduction: normal  ST Segments: no acute change  T Waves: no acute change    Clinical Impression: No findings suggestive of acute ischemia or injury  Comparison to prior EKG: stable as compared to patient's most recent EKG      ------------------------- NURSING NOTES AND VITALS REVIEWED ---------------------------   The nursing notes within the ED encounter and vital signs as below have been reviewed by myself  BP (!) 158/72   Pulse 57   Temp (!) 96.3 °F (35.7 °C)   Resp 16   Ht 5' 6.5\" (1.689 m)   Wt 252 lb (114.3 kg)   SpO2 98%   BMI 40.06 kg/m²     Oxygen Saturation Interpretation: Normal    The patients available past medical records and past encounters were reviewed. ------------------------------ ED COURSE/MEDICAL DECISION MAKING----------------------  Medications - No data to display        The cardiac monitor revealed sinus bradycardia with a heart rate in the 50-60s as interpreted by me. The cardiac monitor was ordered secondary to the patient's chest pain and to monitor for patient for dysrhythmia.    CPT [id number]           Medical Decision Making:     Dr. CHRIS Alex Johns am the primary provider of record    49-year-old female presenting with an episode of epigastric/retrosternal chest pain yesterday. She has had no recurrence of symptoms. However when she told family about the episode they encouraged her to see her PCP who evaluated and in turn sent her to us for evaluation. She has had no recurrence of her symptoms. She arrives hemodynamically stable and well-appearing. EKG shows no findings suggestive of acute ischemia or injury. Troponin is unremarkable at 7, now more than 24 hours since episode. Metabolic panel is within acceptable limits, lipase normal.  No leukocytosis or anemia. Chest x-ray is unremarkable. Patient had no recurrence of symptoms in ED, discussion with patient and she is comfortable with follow-up to PCP. She will be discharged in stable condition with instruction to return for any changes in condition or new symptoms. Re-Evaluations: This patient's ED course included: a personal history and physicial examination, re-evaluation prior to disposition, cardiac monitoring, and continuous pulse oximetry    This patient has remained hemodynamically stable and been closely monitored during their ED course. Counseling: The emergency provider has spoken with the patient and discussed todays results, in addition to providing specific details for the plan of care and counseling regarding the diagnosis and prognosis. Questions are answered at this time and they are agreeable with the plan.       --------------------------------- IMPRESSION AND DISPOSITION ---------------------------------    IMPRESSION  1. Chest pain, unspecified type        DISPOSITION  Disposition: Discharge to home  Patient condition is stable        NOTE: This report was transcribed using voice recognition software.  Every effort was made to ensure accuracy; however, inadvertent computerized transcription errors may be present        Alex Johns

## 2022-08-04 NOTE — ED NOTES
Department of Emergency Medicine    FIRST PROVIDER TRIAGE NOTE             Independent MLP           8/4/22  11:40 AM EDT    Date of Encounter: 8/4/22   MRN: [id number]    Vitals:    08/04/22 1115   BP: (!) 168/77   Pulse: 54   Resp: 17   Temp: (!) 96.3 °F (35.7 °C)   SpO2: 98%   Weight: 252 lb (114.3 kg)   Height: 5' 6.5\" (1.689 m)      HPI: Ander Celaya is a 68 y.o. female who presents to the ED for Chest Pain (Sent in by [facility] for chest pain since 0900 this am)    ROS: Negative for abd pain, vomiting, or diarrhea. Physical Exam:   Gen Appearance/Constitutional: alert  CV: regular rate     Initial Plan of Care: All treatment areas with department are currently occupied. Plan to order/Initiate the following while awaiting opening in ED: labs, EKG, and imaging studies.     Initial Plan of Care: Initiate Treatment-Testing, Proceed toTreatment Area When Bed Available for ED Attending/MLP to Continue Care    Electronically signed by Karina Gallegos PA-C   DD: 8/4/22       Karina Gallegos PA-C  08/04/22 5721

## 2022-08-05 ENCOUNTER — TELEPHONE (OUTPATIENT)
Dept: ADMINISTRATIVE | Age: 77
End: 2022-08-05

## 2022-09-09 ENCOUNTER — APPOINTMENT (OUTPATIENT)
Dept: CT IMAGING | Age: 77
End: 2022-09-09
Payer: COMMERCIAL

## 2022-09-09 ENCOUNTER — HOSPITAL ENCOUNTER (EMERGENCY)
Age: 77
Discharge: HOME | End: 2022-09-09
Attending: EMERGENCY MEDICINE
Payer: COMMERCIAL

## 2022-09-09 VITALS
TEMPERATURE: 97.9 F | WEIGHT: 252 LBS | BODY MASS INDEX: 40.5 KG/M2 | HEART RATE: 60 BPM | OXYGEN SATURATION: 97 % | DIASTOLIC BLOOD PRESSURE: 73 MMHG | HEIGHT: 66 IN | RESPIRATION RATE: 16 BRPM | SYSTOLIC BLOOD PRESSURE: 156 MMHG

## 2022-09-09 DIAGNOSIS — S22.31XA CLOSED FRACTURE OF ONE RIB OF RIGHT SIDE, INITIAL ENCOUNTER: Primary | ICD-10-CM

## 2022-09-09 PROCEDURE — 99284 EMERGENCY DEPT VISIT MOD MDM: CPT

## 2022-09-09 PROCEDURE — 71250 CT THORAX DX C-: CPT

## 2022-09-09 PROCEDURE — 6370000000 HC RX 637 (ALT 250 FOR IP): Performed by: EMERGENCY MEDICINE

## 2022-09-09 RX ORDER — HYDROCODONE BITARTRATE AND ACETAMINOPHEN 5; 325 MG/1; MG/1
1 TABLET ORAL ONCE
Status: COMPLETED | OUTPATIENT
Start: 2022-09-09 | End: 2022-09-09

## 2022-09-09 RX ORDER — HYDROCODONE BITARTRATE AND ACETAMINOPHEN 5; 325 MG/1; MG/1
1 TABLET ORAL EVERY 6 HOURS PRN
Qty: 8 TABLET | Refills: 0 | Status: SHIPPED | OUTPATIENT
Start: 2022-09-09 | End: 2022-09-12

## 2022-09-09 RX ADMIN — HYDROCODONE BITARTRATE AND ACETAMINOPHEN 1 TABLET: 5; 325 TABLET ORAL at 14:48

## 2022-09-09 ASSESSMENT — ENCOUNTER SYMPTOMS
BACK PAIN: 0
ABDOMINAL DISTENTION: 0
ABDOMINAL PAIN: 0
VOMITING: 0
SINUS PRESSURE: 0
EYE PAIN: 0
COUGH: 0
EYE REDNESS: 0
EYE DISCHARGE: 0
SORE THROAT: 0
WHEEZING: 0
SHORTNESS OF BREATH: 0
DIARRHEA: 0
NAUSEA: 0
HEARTBURN: 0

## 2022-09-09 ASSESSMENT — PAIN DESCRIPTION - ORIENTATION: ORIENTATION: RIGHT;LOWER

## 2022-09-09 ASSESSMENT — PAIN DESCRIPTION - LOCATION: LOCATION: CHEST

## 2022-09-09 ASSESSMENT — PAIN SCALES - GENERAL
PAINLEVEL_OUTOF10: 10
PAINLEVEL_OUTOF10: 10

## 2022-09-09 ASSESSMENT — PAIN - FUNCTIONAL ASSESSMENT: PAIN_FUNCTIONAL_ASSESSMENT: 0-10

## 2022-09-09 NOTE — ED PROVIDER NOTES
59-year-old female presents to the emergency department after a fall yesterday at home in which she hit her right chest wall she is were reporting significant right rib pain underneath her right breast.  Reports also mild elbow pain though she states no significant difficulty with range of motion patient states she Aiden did not hit her head she states no neck injury no abdominal pain no other extremity injury EMS reported the patient was able to ambulate to the cot at the scene. The patient is anticoagulated due to A. fib on Eliquis. State no other complaints at this time. The history is provided by the patient. Chest Pain  Pain location:  R lateral chest  Pain quality: sharp    Pain radiates to:  Does not radiate  Pain severity:  Moderate  Onset quality:  Sudden  Duration:  1 day  Timing:  Intermittent  Progression:  Waxing and waning  Chronicity:  New  Relieved by:  Nothing  Worsened by:  Nothing  Ineffective treatments:  None tried  Associated symptoms: no abdominal pain, no AICD problem, no back pain, no claudication, no cough, no dizziness, no fatigue, no fever, no headache, no heartburn, no lower extremity edema, no nausea, no numbness, no palpitations, no shortness of breath, no syncope, no vomiting and no weakness       Review of Systems   Constitutional:  Negative for chills, fatigue and fever. HENT:  Negative for ear pain, sinus pressure and sore throat. Eyes:  Negative for pain, discharge and redness. Respiratory:  Negative for cough, shortness of breath and wheezing. Cardiovascular:  Positive for chest pain (rib pain). Negative for palpitations, claudication and syncope. Gastrointestinal:  Negative for abdominal distention, abdominal pain, diarrhea, heartburn, nausea and vomiting. Genitourinary:  Negative for dysuria and frequency. Musculoskeletal:  Negative for arthralgias and back pain. Skin:  Negative for rash and wound.    Neurological:  Negative for dizziness, weakness, numbness and headaches. Hematological:  Negative for adenopathy. All other systems reviewed and are negative. Physical Exam  Constitutional:       Appearance: Normal appearance. HENT:      Head: Normocephalic and atraumatic. Nose: Nose normal.      Mouth/Throat:      Mouth: Mucous membranes are moist.   Eyes:      Extraocular Movements: Extraocular movements intact. Pupils: Pupils are equal, round, and reactive to light. Cardiovascular:      Rate and Rhythm: Normal rate and regular rhythm. Pulses: Normal pulses. Heart sounds: Normal heart sounds. Chest:       Musculoskeletal:         General: Normal range of motion. Skin:     Capillary Refill: Capillary refill takes less than 2 seconds. Neurological:      General: No focal deficit present. Mental Status: She is alert and oriented to person, place, and time. Procedures     MDM  Number of Diagnoses or Management Options  Closed fracture of one rib of right side, initial encounter  Diagnosis management comments: Patient seen and examined. Concern for rib fractures. CT scan was performed revealing 1 rib fracture. Patient had 1 dose of pain medicine with improvement she was discharged follow-up with her primary care physician with short course of pain medicine and incentive spirometer.   Patient was comfortable this plan was discharged outpatient follow-up.               --------------------------------------------- PAST HISTORY ---------------------------------------------  Past Medical History:  has a past medical history of Anxiety, Arthritis, Back pain, CAD (coronary artery disease), Cancer ([address] Chest pain, Depression, Gastroesophageal reflux disease without esophagitis, Headache(784.0), Heart attack ([address] Hyperlipidemia, Hypertension, Hypotension, Hypothyroid, Iron deficiency anemia, Meningioma ([address] Obesity, Obstructive jaundice, Palpitations, Pruritus, Seizure disorder ([facility] [address] Sinus bradycardia, Sleep apnea, and Thyroid disease. Past Surgical History:  has a past surgical history that includes Brain meningioma excision; Hysterectomy; craniotomy (Left, 08/11/2016); Tonsillectomy and adenoidectomy; Colonoscopy (12/16/2015); eye surgery (Bilateral); Upper gastrointestinal endoscopy; pr colonoscopy flx dx w/collj spec when pfrmd (N/A, 6/11/2018); transesophageal echocardiogram (09/06/2018); Cardioversion (09/06/2018); pr lap,cholecystectomy/graph (N/A, 10/11/2018); ERCP (N/A, 10/26/2018); and Cholecystectomy. Social History:  reports that she quit smoking about 27 years ago. Her smoking use included cigarettes. She started smoking about 64 years ago. She has a 76.00 pack-year smoking history. She has never used smokeless tobacco. She reports that she does not currently use alcohol. She reports that she does not use drugs. Family History: family history includes Arrhythmia in her mother; Breast Cancer in her mother; Cancer in her sister and sister; Diabetes in her mother; Heart Disease in her father. The patients home medications have been reviewed. Allergies: Pcn [penicillins] and Sulfa antibiotics    -------------------------------------------------- RESULTS -------------------------------------------------  Labs:  No results found for this visit on 09/09/22. Radiology:  CT CHEST WO CONTRAST   Final Result   1. Nondisplaced fracture involving anterior right 6th rib. 2. No acute process in the chest.             ------------------------- NURSING NOTES AND VITALS REVIEWED ---------------------------  Date / Time Roomed:  9/9/2022  1:49 PM  ED Bed Assignment:  [id number]    The nursing notes within the ED encounter and vital signs as below have been reviewed.    BP (!) 156/73   Pulse 60   Temp 97.9 °F (36.6 °C) (Oral)   Resp 16   Ht 5' 6\" (1.676 m)   Wt 252 lb (114.3 kg)   SpO2 97%   BMI 40.67 kg/m²   Oxygen Saturation Interpretation: Normal      ------------------------------------------ PROGRESS NOTES ------------------------------------------  I have spoken with the patient and discussed todays results, in addition to providing specific details for the plan of care and counseling regarding the diagnosis and prognosis. Their questions are answered at this time and they are agreeable with the plan. I discussed at length with them reasons for immediate return here for re evaluation. They will followup with their primary care physician by calling their office on Monday.      --------------------------------- ADDITIONAL PROVIDER NOTES ---------------------------------  At this time the patient is without objective evidence of an acute process requiring hospitalization or inpatient management. They have remained hemodynamically stable throughout their entire ED visit and are stable for discharge with outpatient follow-up. The plan has been discussed in detail and they are aware of the specific conditions for emergent return, as well as the importance of follow-up. Discharge Medication List as of 9/9/2022  3:30 PM        START taking these medications    Details   HYDROcodone-acetaminophen (NORCO) 5-325 MG per tablet Take 1 tablet by mouth every 6 hours as needed for Pain for up to 8 doses. Intended supply: 3 days. Take lowest dose possible to manage pain, Disp-8 tablet, R-0Normal             Diagnosis:  1. Closed fracture of one rib of right side, initial encounter        Disposition:  Patient's disposition: Discharge to home  Patient's condition is stable.        Zabrina Vargas DO  09/13/22 [id number]

## 2023-03-25 ENCOUNTER — HOSPITAL ENCOUNTER (EMERGENCY)
Age: 78
Discharge: HOME | End: 2023-03-25
Attending: EMERGENCY MEDICINE
Payer: COMMERCIAL

## 2023-03-25 ENCOUNTER — APPOINTMENT (OUTPATIENT)
Dept: GENERAL RADIOLOGY | Age: 78
End: 2023-03-25
Payer: COMMERCIAL

## 2023-03-25 VITALS
WEIGHT: 228 LBS | HEART RATE: 75 BPM | SYSTOLIC BLOOD PRESSURE: 108 MMHG | BODY MASS INDEX: 35.79 KG/M2 | TEMPERATURE: 97.5 F | HEIGHT: 67 IN | RESPIRATION RATE: 17 BRPM | DIASTOLIC BLOOD PRESSURE: 70 MMHG | OXYGEN SATURATION: 97 %

## 2023-03-25 DIAGNOSIS — R05.2 SUBACUTE COUGH: Primary | ICD-10-CM

## 2023-03-25 DIAGNOSIS — R06.2 WHEEZING: ICD-10-CM

## 2023-03-25 LAB
ANION GAP SERPL CALCULATED.3IONS-SCNC: 11 MMOL/L (ref 7–16)
BASOPHILS # BLD: 0.04 E9/L (ref 0–0.2)
BASOPHILS NFR BLD: 0.4 % (ref 0–2)
BUN SERPL-MCNC: 21 MG/DL (ref 6–23)
CALCIUM SERPL-MCNC: 8.6 MG/DL (ref 8.6–10.2)
CHLORIDE SERPL-SCNC: 104 MMOL/L (ref 98–107)
CO2 SERPL-SCNC: 25 MMOL/L (ref 22–29)
CREAT SERPL-MCNC: 0.9 MG/DL (ref 0.5–1)
EOSINOPHIL # BLD: 0.2 E9/L (ref 0.05–0.5)
EOSINOPHIL NFR BLD: 1.8 % (ref 0–6)
ERYTHROCYTE [DISTWIDTH] IN BLOOD BY AUTOMATED COUNT: 14.4 FL (ref 11.5–15)
GLUCOSE SERPL-MCNC: 97 MG/DL (ref 74–99)
HCT VFR BLD AUTO: 41.6 % (ref 34–48)
HGB BLD-MCNC: 13.3 G/DL (ref 11.5–15.5)
IMM GRANULOCYTES # BLD: 0.05 E9/L
IMM GRANULOCYTES NFR BLD: 0.5 % (ref 0–5)
LYMPHOCYTES # BLD: 1.51 E9/L (ref 1.5–4)
LYMPHOCYTES NFR BLD: 13.8 % (ref 20–42)
MCH RBC QN AUTO: 28.5 PG (ref 26–35)
MCHC RBC AUTO-ENTMCNC: 32 % (ref 32–34.5)
MCV RBC AUTO: 89.3 FL (ref 80–99.9)
MONOCYTES # BLD: 0.65 E9/L (ref 0.1–0.95)
MONOCYTES NFR BLD: 6 % (ref 2–12)
NEUTROPHILS # BLD: 8.46 E9/L (ref 1.8–7.3)
NEUTS SEG NFR BLD: 77.5 % (ref 43–80)
PLATELET # BLD AUTO: 215 E9/L (ref 130–450)
PMV BLD AUTO: 13.4 FL (ref 7–12)
POTASSIUM SERPL-SCNC: 3.9 MMOL/L (ref 3.5–5)
RBC # BLD AUTO: 4.66 E12/L (ref 3.5–5.5)
SODIUM SERPL-SCNC: 140 MMOL/L (ref 132–146)
WBC # BLD: 10.9 E9/L (ref 4.5–11.5)

## 2023-03-25 PROCEDURE — 85025 COMPLETE CBC W/AUTO DIFF WBC: CPT

## 2023-03-25 PROCEDURE — 94664 DEMO&/EVAL PT USE INHALER: CPT

## 2023-03-25 PROCEDURE — 99284 EMERGENCY DEPT VISIT MOD MDM: CPT

## 2023-03-25 PROCEDURE — 96365 THER/PROPH/DIAG IV INF INIT: CPT

## 2023-03-25 PROCEDURE — 71045 X-RAY EXAM CHEST 1 VIEW: CPT

## 2023-03-25 PROCEDURE — 6360000002 HC RX W HCPCS: Performed by: EMERGENCY MEDICINE

## 2023-03-25 PROCEDURE — 6370000000 HC RX 637 (ALT 250 FOR IP): Performed by: EMERGENCY MEDICINE

## 2023-03-25 PROCEDURE — 80048 BASIC METABOLIC PNL TOTAL CA: CPT

## 2023-03-25 PROCEDURE — 94640 AIRWAY INHALATION TREATMENT: CPT

## 2023-03-25 RX ORDER — PREDNISONE 20 MG/1
TABLET ORAL
Qty: 18 TABLET | Refills: 0 | Status: SHIPPED | OUTPATIENT
Start: 2023-03-25 | End: 2023-04-04

## 2023-03-25 RX ORDER — MAGNESIUM SULFATE IN WATER 40 MG/ML
2000 INJECTION, SOLUTION INTRAVENOUS ONCE
Status: COMPLETED | OUTPATIENT
Start: 2023-03-25 | End: 2023-03-25

## 2023-03-25 RX ORDER — IPRATROPIUM BROMIDE AND ALBUTEROL SULFATE 2.5; .5 MG/3ML; MG/3ML
2 SOLUTION RESPIRATORY (INHALATION) ONCE
Status: COMPLETED | OUTPATIENT
Start: 2023-03-25 | End: 2023-03-25

## 2023-03-25 RX ORDER — BROMPHENIRAMINE MALEATE, PSEUDOEPHEDRINE HYDROCHLORIDE, AND DEXTROMETHORPHAN HYDROBROMIDE 2; 30; 10 MG/5ML; MG/5ML; MG/5ML
2.5 SYRUP ORAL 4 TIMES DAILY PRN
Qty: 50 ML | Refills: 0 | Status: SHIPPED | OUTPATIENT
Start: 2023-03-25 | End: 2023-03-30

## 2023-03-25 RX ORDER — ACETAMINOPHEN 500 MG
1000 TABLET ORAL ONCE
Status: COMPLETED | OUTPATIENT
Start: 2023-03-25 | End: 2023-03-25

## 2023-03-25 RX ADMIN — ACETAMINOPHEN 1000 MG: 500 TABLET ORAL at 13:29

## 2023-03-25 RX ADMIN — IPRATROPIUM BROMIDE AND ALBUTEROL SULFATE 2 AMPULE: 2.5; .5 SOLUTION RESPIRATORY (INHALATION) at 12:46

## 2023-03-25 RX ADMIN — MAGNESIUM SULFATE HEPTAHYDRATE 2000 MG: 40 INJECTION, SOLUTION INTRAVENOUS at 13:36

## 2023-03-25 ASSESSMENT — PAIN DESCRIPTION - DESCRIPTORS
DESCRIPTORS: ACHING;THROBBING
DESCRIPTORS: ACHING;POUNDING;THROBBING

## 2023-03-25 ASSESSMENT — ENCOUNTER SYMPTOMS
NAUSEA: 0
DIARRHEA: 0
WHEEZING: 1
ABDOMINAL PAIN: 0
EYE REDNESS: 0
EYE PAIN: 0
VOMITING: 0
ABDOMINAL DISTENTION: 0
SORE THROAT: 0
SINUS PRESSURE: 0
BACK PAIN: 0
SHORTNESS OF BREATH: 1
EYE DISCHARGE: 0
COUGH: 1

## 2023-03-25 ASSESSMENT — PAIN - FUNCTIONAL ASSESSMENT
PAIN_FUNCTIONAL_ASSESSMENT: 0-10
PAIN_FUNCTIONAL_ASSESSMENT: NONE - DENIES PAIN

## 2023-03-25 ASSESSMENT — PAIN DESCRIPTION - LOCATION
LOCATION: ABDOMEN;BACK;HEAD
LOCATION: ABDOMEN;BACK;HEAD

## 2023-03-25 ASSESSMENT — LIFESTYLE VARIABLES
HOW MANY STANDARD DRINKS CONTAINING ALCOHOL DO YOU HAVE ON A TYPICAL DAY: PATIENT DOES NOT DRINK
HOW OFTEN DO YOU HAVE A DRINK CONTAINING ALCOHOL: NEVER

## 2023-03-25 ASSESSMENT — PAIN DESCRIPTION - PAIN TYPE: TYPE: ACUTE PAIN

## 2023-03-25 ASSESSMENT — PAIN SCALES - GENERAL
PAINLEVEL_OUTOF10: 9
PAINLEVEL_OUTOF10: 9

## 2023-03-25 NOTE — ED NOTES
96% on RA while ambulating in [place]. No assistive devices needed, gait steady.       Varun Jeter LPN  74/36/48 [id number]

## 2023-03-25 NOTE — ED PROVIDER NOTES
55-year-old female presents to the emergency department with shortness of breath wheezing and cough. Patient states the symptoms going on for few days she was in urgent care today was satting 98% and then developed a coughing fit and could not stop. She states they gave her a shot of Decadron then EMS gave her a DuoNeb on the way in but no other treatments were provided. No x-ray was done. She reports no fevers or chills no generalized body aches headache or vision changes sinus congestion or other complaints. She reports no other symptoms at this time. The history is provided by the patient. Shortness of Breath  Severity:  Moderate  Onset quality:  Sudden  Duration:  2 hours  Timing:  Intermittent  Progression:  Waxing and waning  Chronicity:  New  Relieved by:  Nothing  Worsened by:  Nothing  Ineffective treatments:  None tried  Associated symptoms: cough and wheezing    Associated symptoms: no abdominal pain, no chest pain, no ear pain, no fever, no headaches, no rash, no sore throat and no vomiting       Review of Systems   Constitutional:  Negative for chills and fever. HENT:  Negative for ear pain, sinus pressure and sore throat. Eyes:  Negative for pain, discharge and redness. Respiratory:  Positive for cough, shortness of breath and wheezing. Cardiovascular:  Negative for chest pain. Gastrointestinal:  Negative for abdominal distention, abdominal pain, diarrhea, nausea and vomiting. Genitourinary:  Negative for dysuria and frequency. Musculoskeletal:  Negative for arthralgias and back pain. Skin:  Negative for rash and wound. Neurological:  Negative for weakness and headaches. Hematological:  Negative for adenopathy. All other systems reviewed and are negative. Physical Exam  Constitutional:       Appearance: She is well-developed. HENT:      Head: Normocephalic and atraumatic.       Mouth/Throat:      Mouth: Mucous membranes are moist.   Eyes:      Pupils: Pupils are equal, round, and reactive to light. Cardiovascular:      Rate and Rhythm: Normal rate and regular rhythm. Pulmonary:      Effort: Pulmonary effort is normal. Tachypnea present. Breath sounds: Examination of the right-middle field reveals wheezing. Examination of the left-middle field reveals wheezing. Examination of the right-lower field reveals wheezing. Examination of the left-lower field reveals wheezing. Wheezing present. Comments: Actively coughing in room  Abdominal:      General: Bowel sounds are normal.      Palpations: Abdomen is soft. Musculoskeletal:         General: Normal range of motion. Cervical back: Normal range of motion and neck supple. Right lower leg: No tenderness. No edema. Left lower leg: No tenderness. No edema. Skin:     General: Skin is warm. Capillary Refill: Capillary refill takes less than 2 seconds. Neurological:      General: No focal deficit present. Mental Status: She is alert and oriented to person, place, and time. Procedures     MDM  Number of Diagnoses or Management Options  Subacute cough  Wheezing  Diagnosis management comments: Patient was seen and examined. Initial breathing treatment and labs were drawn. Chest x-ray revealed no concerning findings. Patient symptoms seem consistent with an asthma exacerbation versus viral syndrome versus pneumonia. With the x-ray showing no concerning findings for pneumonia and patient having no leukocytosis and fever is felt is unlikely to be bacterial pneumonia. Viral pneumonia certainly is still a consideration. Patient was given DuoNebs provided minimal relief and she was given a dose of magnesium 2 g over 30 minutes IV. This helped both her breathing and her headache that she had. Patient was ambulated in the department not dropping below 96% on room air.   She was agreeable with plan of discharge she was provided reasons to return to the emergency department including

## 2023-08-29 NOTE — PROGRESS NOTES
Status: Pt s/p segmental mandibulectomy, partial glossectomy, bilateral neck dissection reconstruction of the mandible with left fibular free flap, reconstruction of the chin defect with left ALT free flap, and trach placement 8/17. S/p PEG placement 8/23. S/p I+D cufand trach exchange today  Vitals: VSS on RA, HME in place  Neuros: Intact, writes to communicate   IV: PIV SL  Labs/Electrolytes: Replaced mag and phos today. Hgb 7.5  Resp/trach: Course lungs. #6 cuffless shiley sutured in place. Strong cough, no suctioning needed this shift. Inner cannula changed x2. Secretions thick, white/yellow  Diet: NPO most of day. Started bolus after procedure. 1 can currently infusing. Only wants 2 today and will start fresh tomorrow. 60ml before and after feeding via PEG  Bowel status: No BM today  : Voiding  Skin: Bilateral neck incision with sutures. Open dehiscence area with BID packing performed by ENT (supplies placed at bedside). Trach sutures in place. L thigh STSG and incision RANDALL, sutures removed in OR today. LLE fibular flap dressing changed today, CDI. Tongue and chin spot checks WNL, pale pink, soft, and warm. Thigh DARWIN with yellow output. PEG site CDI  Pain: Pain managed with tylenol  Activity: SBA and walker. CAM boot LLE   Social:  visiting today  Plan: Encourage independence with cares. Continue with POC          Pcn [Penicillins] Other (See Comments)     Unknown      Sulfa Antibiotics Swelling     Whole body and itching         MEDICATION SIDE EFFECTS:none       SCHEDULED MEDS:                                 Current Facility-Administered Medications   Medication Dose Route Frequency Provider Last Rate Last Dose    [START ON 10/26/2018] indomethacin (INDOCIN) 50 MG suppository 100 mg  100 mg Rectal See Admin Instructions Mirtha BARKER LUIS Stephenson - CNP        [START ON 10/26/2018] ciprofloxacin (CIPRO) IVPB 400 mg  400 mg Intravenous See Admin Instructions Mirtha Stephenson, APRN - CNP        potassium chloride (KLOR-CON M) extended release tablet 10 mEq  10 mEq Oral TID Dilan Coral Mihok, DO        sodium chloride (PF) 0.9 % injection 10 mL  10 mL Intravenous PRN JUSTINA Claros        FLUoxetine (PROZAC) capsule 20 mg  20 mg Oral QAM Ashkan A Mihok, DO   20 mg at 10/25/18 0851    gabapentin (NEURONTIN) capsule 300 mg  300 mg Oral BID Dilan Coral Mihok, DO   300 mg at 10/25/18 0851    levETIRAcetam (KEPPRA) tablet 500 mg  500 mg Oral Nightly Dilan Coral Mihok, DO   500 mg at 10/24/18 2132    pantoprazole (PROTONIX) tablet 40 mg  40 mg Oral BID AC Ashkan A Mihok, DO   40 mg at 10/25/18 9529    metoprolol succinate (TOPROL XL) extended release tablet 25 mg  25 mg Oral QAM Ashkan A Mihok, DO   Stopped at 10/25/18 1074    metoclopramide (REGLAN) tablet 5 mg  5 mg Oral 4x Daily PRN Dilan Coral Mihok, DO        levothyroxine (SYNTHROID) tablet 150 mcg  150 mcg Oral QAM Ashkan A Mihok, DO   150 mcg at 10/25/18 0618    0.9 % sodium chloride infusion   Intravenous Continuous Ashkan A Mihok,  mL/hr at 10/24/18 1709      morphine (PF) injection 2 mg  2 mg Intravenous Q2H PRN Dilan Coral Mihok, DO   2 mg at 10/24/18 2131    Or    morphine (PF) injection 4 mg  4 mg Intravenous Q2H PRN Dilan Coral Mihok, DO        acetaminophen (TYLENOL) tablet 650 mg  650 mg Oral Q4H PRN Ashkan A Mihok, DO        heparin (porcine) injection 5,000 Units  5,000 for: Sabrina Kathleen  PTT:    Lab Results   Component Value Date    APTT 45.4 10/24/2018   [APTT}  Troponin:    Lab Results   Component Value Date    TROPONINI <0.01 04/10/2018     Last 3 Troponin:    Lab Results   Component Value Date    TROPONINI <0.01 04/10/2018    TROPONINI <0.01 04/10/2018    TROPONINI <0.01 04/09/2018     U/A:    Lab Results   Component Value Date    COLORU Yellow 10/24/2018    PROTEINU Negative 10/24/2018    PHUR 7.0 10/24/2018    WBCUA 2-5 10/24/2018    RBCUA >20 10/24/2018    BACTERIA RARE 10/24/2018    CLARITYU Clear 10/24/2018    SPECGRAV <=1.005 10/24/2018    LEUKOCYTESUR SMALL 10/24/2018    UROBILINOGEN 1.0 10/24/2018    BILIRUBINUR SMALL 10/24/2018    BLOODU LARGE 10/24/2018    GLUCOSEU Negative 10/24/2018     HgBA1c:    Lab Results   Component Value Date    LABA1C 5.0 10/24/2018     FLP:    Lab Results   Component Value Date    TRIG 164 10/25/2018    HDL 28 10/25/2018    LDLCALC 142 10/25/2018    LABVLDL 33 10/25/2018     TSH:    Lab Results   Component Value Date    TSH 0.860 10/25/2018     VITAMIN B12: No components found for: B12  FOLATE:    Lab Results   Component Value Date    FOLATE 10.8 10/24/2018        CBC:  Recent Labs      10/24/18   1018  10/25/18   0450   WBC  7.6  9.2   RBC  4.22  3.61   HGB  11.6  10.0*   HCT  36.6  31.1*   PLT  374  327   MCV  86.7  86.1   MCH  27.5  27.7   MCHC  31.7*  32.2   RDW  18.3*  18.3*   LYMPHOPCT  16.5*  11.0*   MONOPCT  8.7  7.9   BASOPCT  0.7  0.4   MONOSABS  0.66  0.73   LYMPHSABS  1.25*  1.01*   EOSABS  0.22  0.07   BASOSABS  0.05  0.04          H & H :  Recent Labs      10/24/18   1018  10/25/18   0450   HGB  11.6  10.0*       TSH:  Recent Labs      10/25/18   0450   TSH  0.860       GLUCOSE:No results for input(s): POCGLU in the last 72 hours.     CMP:  Recent Labs      10/24/18   1018  10/25/18   0450   NA  140  133   K  3.7  3.4*   CL  99  97*   CO2  29  26   BUN  23  20   CREATININE  1.3*  1.1*   GFRAA  49  59   LABGLOM  40  49

## 2023-12-05 ENCOUNTER — HOSPITAL ENCOUNTER (EMERGENCY)
Age: 78
Discharge: HOME | End: 2023-12-05
Attending: EMERGENCY MEDICINE
Payer: MEDICARE

## 2023-12-05 ENCOUNTER — APPOINTMENT (OUTPATIENT)
Dept: GENERAL RADIOLOGY | Age: 78
End: 2023-12-05
Payer: MEDICARE

## 2023-12-05 VITALS
WEIGHT: 249.12 LBS | RESPIRATION RATE: 20 BRPM | HEIGHT: 66 IN | HEART RATE: 72 BPM | SYSTOLIC BLOOD PRESSURE: 134 MMHG | BODY MASS INDEX: 40.04 KG/M2 | DIASTOLIC BLOOD PRESSURE: 71 MMHG | OXYGEN SATURATION: 99 % | TEMPERATURE: 98.2 F

## 2023-12-05 DIAGNOSIS — J44.1 COPD EXACERBATION (HCC): Primary | ICD-10-CM

## 2023-12-05 DIAGNOSIS — J98.01 BRONCHOSPASM: ICD-10-CM

## 2023-12-05 DIAGNOSIS — J06.9 UPPER RESPIRATORY TRACT INFECTION, UNSPECIFIED TYPE: ICD-10-CM

## 2023-12-05 LAB
ALBUMIN SERPL-MCNC: 3.5 G/DL (ref 3.5–5.2)
ALP SERPL-CCNC: 130 U/L (ref 35–104)
ALT SERPL-CCNC: 9 U/L (ref 0–32)
ANION GAP SERPL CALCULATED.3IONS-SCNC: 10 MMOL/L (ref 7–16)
AST SERPL-CCNC: 13 U/L (ref 0–31)
B PARAP IS1001 DNA NPH QL NAA+NON-PROBE: NOT DETECTED
B PERT DNA SPEC QL NAA+PROBE: NOT DETECTED
BASOPHILS # BLD: 0.02 K/UL (ref 0–0.2)
BASOPHILS NFR BLD: 0 % (ref 0–2)
BILIRUB SERPL-MCNC: 0.5 MG/DL (ref 0–1.2)
BNP SERPL-MCNC: 264 PG/ML (ref 0–450)
BUN SERPL-MCNC: 19 MG/DL (ref 6–23)
C PNEUM DNA NPH QL NAA+NON-PROBE: NOT DETECTED
CALCIUM SERPL-MCNC: 8.5 MG/DL (ref 8.6–10.2)
CHLORIDE SERPL-SCNC: 100 MMOL/L (ref 98–107)
CO2 SERPL-SCNC: 27 MMOL/L (ref 22–29)
CREAT SERPL-MCNC: 0.9 MG/DL (ref 0.5–1)
EKG ATRIAL RATE: 61 BPM
EKG P AXIS: -20 DEGREES
EKG P-R INTERVAL: 150 MS
EKG Q-T INTERVAL: 456 MS
EKG QRS DURATION: 96 MS
EKG QTC CALCULATION (BAZETT): 459 MS
EKG R AXIS: 12 DEGREES
EKG T AXIS: 16 DEGREES
EKG VENTRICULAR RATE: 61 BPM
EOSINOPHIL # BLD: 0.08 K/UL (ref 0.05–0.5)
EOSINOPHILS RELATIVE PERCENT: 1 % (ref 0–6)
ERYTHROCYTE [DISTWIDTH] IN BLOOD BY AUTOMATED COUNT: 14.4 % (ref 11.5–15)
FLUAV RNA NPH QL NAA+NON-PROBE: NOT DETECTED
FLUBV RNA NPH QL NAA+NON-PROBE: NOT DETECTED
GFR SERPL CREATININE-BSD FRML MDRD: >60 ML/MIN/1.73M2
GLUCOSE SERPL-MCNC: 107 MG/DL (ref 74–99)
HADV DNA NPH QL NAA+NON-PROBE: NOT DETECTED
HCOV 229E RNA NPH QL NAA+NON-PROBE: NOT DETECTED
HCOV HKU1 RNA NPH QL NAA+NON-PROBE: NOT DETECTED
HCOV NL63 RNA NPH QL NAA+NON-PROBE: NOT DETECTED
HCOV OC43 RNA NPH QL NAA+NON-PROBE: NOT DETECTED
HCT VFR BLD AUTO: 37.6 % (ref 34–48)
HGB BLD-MCNC: 11.9 G/DL (ref 11.5–15.5)
HMPV RNA NPH QL NAA+NON-PROBE: NOT DETECTED
HPIV1 RNA NPH QL NAA+NON-PROBE: NOT DETECTED
HPIV2 RNA NPH QL NAA+NON-PROBE: NOT DETECTED
HPIV3 RNA NPH QL NAA+NON-PROBE: NOT DETECTED
HPIV4 RNA NPH QL NAA+NON-PROBE: NOT DETECTED
IMM GRANULOCYTES # BLD AUTO: <0.03 K/UL (ref 0–0.58)
IMM GRANULOCYTES NFR BLD: 0 % (ref 0–5)
INFLUENZA A BY PCR: NOT DETECTED
INFLUENZA B BY PCR: NOT DETECTED
LYMPHOCYTES NFR BLD: 0.52 K/UL (ref 1.5–4)
LYMPHOCYTES RELATIVE PERCENT: 9 % (ref 20–42)
M PNEUMO DNA NPH QL NAA+NON-PROBE: NOT DETECTED
MCH RBC QN AUTO: 26.6 PG (ref 26–35)
MCHC RBC AUTO-ENTMCNC: 31.6 G/DL (ref 32–34.5)
MCV RBC AUTO: 84.1 FL (ref 80–99.9)
MONOCYTES NFR BLD: 0.12 K/UL (ref 0.1–0.95)
MONOCYTES NFR BLD: 2 % (ref 2–12)
NEUTROPHILS NFR BLD: 87 % (ref 43–80)
NEUTS SEG NFR BLD: 5.04 K/UL (ref 1.8–7.3)
PLATELET # BLD AUTO: 206 K/UL (ref 130–450)
PMV BLD AUTO: 12.2 FL (ref 7–12)
POTASSIUM SERPL-SCNC: 3.7 MMOL/L (ref 3.5–5)
PROT SERPL-MCNC: 6.3 G/DL (ref 6.4–8.3)
RBC # BLD AUTO: 4.47 M/UL (ref 3.5–5.5)
RBC # BLD: NORMAL 10*6/UL
RSV RNA NPH QL NAA+NON-PROBE: NOT DETECTED
RV+EV RNA NPH QL NAA+NON-PROBE: NOT DETECTED
SARS-COV-2 RDRP RESP QL NAA+PROBE: NOT DETECTED
SARS-COV-2 RNA NPH QL NAA+NON-PROBE: NOT DETECTED
SODIUM SERPL-SCNC: 137 MMOL/L (ref 132–146)
SPECIMEN DESCRIPTION: NORMAL
SPECIMEN DESCRIPTION: NORMAL
SPECIMEN SOURCE: NORMAL
STREP A, MOLECULAR: NEGATIVE
TROPONIN I SERPL HS-MCNC: 7 NG/L (ref 0–9)
TROPONIN I SERPL HS-MCNC: 7 NG/L (ref 0–9)
WBC OTHER # BLD: 5.8 K/UL (ref 4.5–11.5)

## 2023-12-05 PROCEDURE — 87651 STREP A DNA AMP PROBE: CPT

## 2023-12-05 PROCEDURE — 83880 ASSAY OF NATRIURETIC PEPTIDE: CPT

## 2023-12-05 PROCEDURE — 80053 COMPREHEN METABOLIC PANEL: CPT

## 2023-12-05 PROCEDURE — 93005 ELECTROCARDIOGRAM TRACING: CPT

## 2023-12-05 PROCEDURE — 99285 EMERGENCY DEPT VISIT HI MDM: CPT

## 2023-12-05 PROCEDURE — 71046 X-RAY EXAM CHEST 2 VIEWS: CPT

## 2023-12-05 PROCEDURE — 87635 SARS-COV-2 COVID-19 AMP PRB: CPT

## 2023-12-05 PROCEDURE — 87502 INFLUENZA DNA AMP PROBE: CPT

## 2023-12-05 PROCEDURE — 0202U NFCT DS 22 TRGT SARS-COV-2: CPT

## 2023-12-05 PROCEDURE — 85025 COMPLETE CBC W/AUTO DIFF WBC: CPT

## 2023-12-05 PROCEDURE — 93010 ELECTROCARDIOGRAM REPORT: CPT | Performed by: INTERNAL MEDICINE

## 2023-12-05 PROCEDURE — 94640 AIRWAY INHALATION TREATMENT: CPT

## 2023-12-05 PROCEDURE — 6370000000 HC RX 637 (ALT 250 FOR IP)

## 2023-12-05 PROCEDURE — 84484 ASSAY OF TROPONIN QUANT: CPT

## 2023-12-05 RX ORDER — AZITHROMYCIN 250 MG/1
250 TABLET, FILM COATED ORAL SEE ADMIN INSTRUCTIONS
Qty: 6 TABLET | Refills: 0 | Status: SHIPPED | OUTPATIENT
Start: 2023-12-05 | End: 2023-12-10

## 2023-12-05 RX ORDER — PREDNISONE 20 MG/1
60 TABLET ORAL ONCE
Status: DISCONTINUED | OUTPATIENT
Start: 2023-12-05 | End: 2023-12-05

## 2023-12-05 RX ORDER — IPRATROPIUM BROMIDE AND ALBUTEROL SULFATE 2.5; .5 MG/3ML; MG/3ML
3 SOLUTION RESPIRATORY (INHALATION) ONCE
Status: COMPLETED | OUTPATIENT
Start: 2023-12-05 | End: 2023-12-05

## 2023-12-05 RX ORDER — PREDNISONE 50 MG/1
50 TABLET ORAL DAILY
Qty: 5 TABLET | Refills: 0 | Status: SHIPPED | OUTPATIENT
Start: 2023-12-05 | End: 2023-12-10

## 2023-12-05 RX ADMIN — IPRATROPIUM BROMIDE AND ALBUTEROL SULFATE 3 DOSE: 2.5; .5 SOLUTION RESPIRATORY (INHALATION) at 13:41

## 2023-12-05 NOTE — DISCHARGE INSTRUCTIONS
Please take your medications as prescribed. As we discussed, we recommend that you use your inhaler every 4 hours for the next 2 to 5 days. Reasons to return would be worsening of your symptoms, severe chest pain, severe shortness of breath, severe nausea with vomiting, or uncontrolled fevers.

## 2023-12-05 NOTE — ED PROVIDER NOTES
[address] ENCOUNTER        Pt Name: Mac Doherty  MRN: [id number]  [address] [facility] [place] 1945  Date of evaluation: 12/5/2023  Provider: Jac Christina MD  Attending Provider: No att. providers found  PCP: Elias Ramos MD  Note Started: 12:09 PM EST 12/5/23    CHIEF COMPLAINT       Chief Complaint   Patient presents with    Pharyngitis     Pt arrives with ems from urgent care c/o sore throat 5/10 x 2 days. Shortness of Breath     Pt states sob x 2 days. Hx of recurrent bronchitis. Cough     Pt states cough, x 2 days. Dry cough       HISTORY OF PRESENT ILLNESS: 1 or more Elements   History From: The patient        Mac Doherty is a 66 y.o. female with a past medical history of paroxysmal atrial fibrillation on Eliquis, hypertension, hyperlipidemia, meningioma, and chronic bronchitis presenting for pharyngitis, shortness of breath, and cough. Patient symptoms have been ongoing for 2 days. She states that she typically gets the symptoms around this time of year and has exacerbated her chronic bronchitis. She states that she has been having a sore throat, hoarseness to her voice, shortness of breath that is worse with exertion and unrelated to rest.  And a nonproductive cough. She reports that she has an inhaler at home which has been somewhat helping her symptoms. She presented originally to an urgent care who did viral testing which was negative. She was sent from the urgent care to the emergency room although she states that she is not clear why or what specific concern they had for the patient to be sent to the emergency room. Patient is a former smoker having quit 35 years ago, denies alcohol use, denies drug use.   Patient denies headache, fever, chest pain, nausea, vomiting, abdominal pain, hematuria, dysuria, increasing or decreasing urine frequency, blood in stool, constipation, diarrhea    Nursing Notes were

## 2024-02-26 ENCOUNTER — HOSPITAL ENCOUNTER (EMERGENCY)
Age: 79
Discharge: HOME | End: 2024-02-26
Attending: STUDENT IN AN ORGANIZED HEALTH CARE EDUCATION/TRAINING PROGRAM
Payer: MEDICARE

## 2024-02-26 ENCOUNTER — APPOINTMENT (OUTPATIENT)
Dept: CT IMAGING | Age: 79
End: 2024-02-26
Payer: MEDICARE

## 2024-02-26 VITALS
RESPIRATION RATE: 16 BRPM | OXYGEN SATURATION: 97 % | TEMPERATURE: 97.6 F | HEART RATE: 60 BPM | HEIGHT: 66 IN | DIASTOLIC BLOOD PRESSURE: 74 MMHG | BODY MASS INDEX: 40.18 KG/M2 | SYSTOLIC BLOOD PRESSURE: 132 MMHG | WEIGHT: 250 LBS
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
## 2024-02-26 DIAGNOSIS — S01.81XA FACIAL LACERATION, INITIAL ENCOUNTER: Primary | ICD-10-CM

## 2024-02-26 DIAGNOSIS — Z79.01 ON CONTINUOUS ORAL ANTICOAGULATION: ICD-10-CM

## 2024-02-26 PROCEDURE — 6360000002 HC RX W HCPCS: Performed by: STUDENT IN AN ORGANIZED HEALTH CARE EDUCATION/TRAINING PROGRAM

## 2024-02-26 PROCEDURE — 2500000003 HC RX 250 WO HCPCS: Performed by: STUDENT IN AN ORGANIZED HEALTH CARE EDUCATION/TRAINING PROGRAM

## 2024-02-26 PROCEDURE — 99284 EMERGENCY DEPT VISIT MOD MDM: CPT

## 2024-02-26 PROCEDURE — 12013 RPR F/E/E/N/L/M 2.6-5.0 CM: CPT

## 2024-02-26 PROCEDURE — 6370000000 HC RX 637 (ALT 250 FOR IP): Performed by: STUDENT IN AN ORGANIZED HEALTH CARE EDUCATION/TRAINING PROGRAM

## 2024-02-26 PROCEDURE — 70486 CT MAXILLOFACIAL W/O DYE: CPT

## 2024-02-26 PROCEDURE — 72125 CT NECK SPINE W/O DYE: CPT

## 2024-02-26 PROCEDURE — 90714 TD VACC NO PRESV 7 YRS+ IM: CPT | Performed by: STUDENT IN AN ORGANIZED HEALTH CARE EDUCATION/TRAINING PROGRAM

## 2024-02-26 PROCEDURE — 70450 CT HEAD/BRAIN W/O DYE: CPT

## 2024-02-26 PROCEDURE — 90471 IMMUNIZATION ADMIN: CPT | Performed by: STUDENT IN AN ORGANIZED HEALTH CARE EDUCATION/TRAINING PROGRAM

## 2024-02-26 RX ORDER — TETANUS AND DIPHTHERIA TOXOIDS ADSORBED 2; 2 [LF]/.5ML; [LF]/.5ML
0.5 INJECTION INTRAMUSCULAR ONCE
Status: COMPLETED | OUTPATIENT
Start: 2024-02-26 | End: 2024-02-26

## 2024-02-26 RX ORDER — BACITRACIN ZINC 500 [USP'U]/G
OINTMENT TOPICAL ONCE
Status: COMPLETED | OUTPATIENT
Start: 2024-02-26 | End: 2024-02-26

## 2024-02-26 RX ORDER — LIDOCAINE HYDROCHLORIDE 10 MG/ML
5 INJECTION, SOLUTION INFILTRATION; PERINEURAL ONCE
Status: COMPLETED | OUTPATIENT
Start: 2024-02-26 | End: 2024-02-26

## 2024-02-26 RX ADMIN — TETANUS AND DIPHTHERIA TOXOIDS ADSORBED 0.5 ML: 2; 2 INJECTION INTRAMUSCULAR at 21:55

## 2024-02-26 RX ADMIN — LIDOCAINE HYDROCHLORIDE 5 ML: 10 INJECTION, SOLUTION INFILTRATION; PERINEURAL at 22:20

## 2024-02-26 RX ADMIN — BACITRACIN ZINC: 500 OINTMENT TOPICAL at 23:28

## 2024-02-26 ASSESSMENT — ENCOUNTER SYMPTOMS
BACK PAIN: 0
COUGH: 0
VOMITING: 0
ABDOMINAL PAIN: 0
DIARRHEA: 0
SHORTNESS OF BREATH: 0
NAUSEA: 0
COLOR CHANGE: 0

## 2024-02-26 ASSESSMENT — PAIN DESCRIPTION - LOCATION
LOCATION: FACE
LOCATION: FACE

## 2024-02-26 ASSESSMENT — PAIN - FUNCTIONAL ASSESSMENT
PAIN_FUNCTIONAL_ASSESSMENT: 0-10
PAIN_FUNCTIONAL_ASSESSMENT: NONE - DENIES PAIN

## 2024-02-26 ASSESSMENT — PAIN DESCRIPTION - DESCRIPTORS
DESCRIPTORS: ACHING;THROBBING;DISCOMFORT
DESCRIPTORS: ACHING

## 2024-02-26 ASSESSMENT — PAIN SCALES - GENERAL
PAINLEVEL_OUTOF10: 5
PAINLEVEL_OUTOF10: 7

## 2024-02-26 ASSESSMENT — PAIN DESCRIPTION - ORIENTATION
ORIENTATION: RIGHT
ORIENTATION: RIGHT

## 2024-02-27 NOTE — ED PROVIDER NOTES
RESULTS -------------------------------------------------  Labs:  No results found for this visit on 02/26/24.    Radiology:  CT HEAD WO CONTRAST   Final Result   No acute intracranial abnormality.      No acute traumatic injury of the facial bones.         CT FACIAL BONES WO CONTRAST   Final Result   No acute intracranial abnormality.      No acute traumatic injury of the facial bones.         CT CERVICAL SPINE WO CONTRAST   Final Result   No acute abnormality of the cervical spine.      Moderate cervical spondylosis.             ------------------------- NURSING NOTES AND VITALS REVIEWED ---------------------------  Date / Time Roomed:  2/26/2024  9:03 PM  ED Bed Assignment:  04/04    The nursing notes within the ED encounter and vital signs as below have been reviewed.   BP (!) 146/82   Pulse 53   Temp 97.6 °F (36.4 °C) (Oral)   Resp 16   Ht 1.676 m (5' 6\")   Wt 113.4 kg (250 lb)   SpO2 96%   BMI 40.35 kg/m²   Oxygen Saturation Interpretation: Normal    --------------------------------- ADDITIONAL PROVIDER NOTES ---------------------------------  At this time the patient is without objective evidence of an acute process requiring hospitalization or inpatient management.  They have remained hemodynamically stable throughout their entire ED visit and are stable for discharge with outpatient follow-up.     The plan has been discussed in detail and they are aware of the specific conditions for emergent return, as well as the importance of follow-up.      New Prescriptions    No medications on file       Diagnosis:  1. Facial laceration, initial encounter    2. Fall, initial encounter    3. On continuous oral anticoagulation        Disposition:  Patient's disposition: Discharge to home  Patient's condition is stable.         Peewee Flor DO  02/26/24 6744

## 2024-02-27 NOTE — DISCHARGE INSTRUCTIONS
Watch for signs of infection.  If you develop redness, drainage swelling go to emergency department for evaluation.      Suture removal in 5 to 7 days

## (undated) DEVICE — SKIN AFFIX SURG ADHESIVE 72/CS 0.55ML: Brand: MEDLINE

## (undated) DEVICE — SYRINGE MED 20ML STD CLR PLAS LUERLOCK TIP N CTRL DISP

## (undated) DEVICE — SET INSTRUMENT LAP I

## (undated) DEVICE — TROCAR ENDOSCP L100MM DIA12MM DIL TIP STBL SL ENDOPATH XCEL

## (undated) DEVICE — PATIENT RETURN ELECTRODE, SINGLE-USE, CONTACT QUALITY MONITORING, ADULT, WITH 9FT CORD, FOR PATIENTS WEIGING OVER 33LBS. (15KG): Brand: MEGADYNE

## (undated) DEVICE — SET ENDO INSTR LAPAROSCOPIC STGENLAP

## (undated) DEVICE — TIBURON GENERAL ENDOSCOPY DRAPE: Brand: CONVERTORS

## (undated) DEVICE — CONTAINER VACUTAINER ANAER CULTURE SWAB

## (undated) DEVICE — BLOCK BITE 60FR RUBBER ADLT DENTAL

## (undated) DEVICE — POUCH SPEC RETRV ENDOPCH 4X4IN

## (undated) DEVICE — CAMERA STRYKER 1488 HD GEN

## (undated) DEVICE — ANTISEPTIC 16OZ H PEROX 1ST AID ORAL DEBRIDING AGNT

## (undated) DEVICE — KIT,ANTI FOG,W/SPONGE & FLUID,SOFT PACK: Brand: MEDLINE

## (undated) DEVICE — APPLIER CLP M L L11.4IN DIA10MM ENDOSCP ROT MULT FOR LIG

## (undated) DEVICE — SYSTEM BX CAP BILI RAP EXCHG CAP LOK DEV COMPATIBLE W/ OLY

## (undated) DEVICE — SPHINCTEROTOME: Brand: DREAMTOME™ RX 44

## (undated) DEVICE — SHEARS ENDOSCP L36CM DIA5MM ULTRASONIC CRV TIP ADAPTIVE

## (undated) DEVICE — GRADUATE TRIANG MEASURE 1000ML BLK PRNT

## (undated) DEVICE — SOLUTION IV IRRIG POUR BRL 0.9% SODIUM CHL 2F7124

## (undated) DEVICE — WARMER SCP LAP

## (undated) DEVICE — GLOVE ORANGE PI 7   MSG9070

## (undated) DEVICE — RETRIEVAL BALLOON CATHETER: Brand: EXTRACTOR™ PRO RX

## (undated) DEVICE — TROCAR ENDOSCP SHFT L100MM DIA5MM DIL TIP ENDOPATH XCEL

## (undated) DEVICE — 3 ML SYRINGE LUER-LOCK TIP: Brand: MONOJECT

## (undated) DEVICE — INTENDED FOR TISSUE SEPARATION, AND OTHER PROCEDURES THAT REQUIRE A SHARP SURGICAL BLADE TO PUNCTURE OR CUT.: Brand: BARD-PARKER ® STAINLESS STEEL BLADES

## (undated) DEVICE — CATHETER CHOLGM LAPSCP 5 MMX32 CM

## (undated) DEVICE — Z INACTIVE USE 2660664 SOLUTION IRRIG 3000ML 0.9% SOD CHL USP UROMATIC PLAS CONT

## (undated) DEVICE — CANNULA NSL ORAL AD FOR CAPNOFLEX CO2 O2 AIRLFE

## (undated) DEVICE — TROCAR ENDOSCP L100MM DIA5MM BLDELSS STBL SL OBT RADLUC

## (undated) DEVICE — TOWEL,OR,DSP,ST,BLUE,STD,6/PK,12PK/CS: Brand: MEDLINE

## (undated) DEVICE — SWAB SPEC COLL SHFT L5.25IN POLYUR FOAM TIP SFT DBL MEDIA

## (undated) DEVICE — PUMP SUC IRR TBNG L10FT W/ HNDPC ASSEMB STRYKEFLOW 2

## (undated) DEVICE — LAPAROSCOPIC SCISSORS: Brand: EPIX LAPAROSCOPIC SCISSORS

## (undated) DEVICE — TROCAR ENDOSCP L100MM DIA12MM UNIV SL OBT RADLUC STBL SL

## (undated) DEVICE — INSUFFLATION TUBING SET WITH FILTER, FUNNEL CONNECTOR AND LUER LOCK: Brand: JOSNOE MEDICAL INC

## (undated) DEVICE — 1.5L THIN WALL CAN: Brand: CRD

## (undated) DEVICE — NEEDLE INSUF L120MM DIA2MM DISP FOR PNEUMOPERI ENDOPATH

## (undated) DEVICE — APPLICATOR PREP 26ML 0.7% IOD POVACRYLEX 74% ISO ALC ST

## (undated) DEVICE — SPONGE GZ W4XL4IN RAYON POLY FILL CVR W/ NONWOVEN FAB

## (undated) DEVICE — SURGICAL PROCEDURE PACK BASIC

## (undated) DEVICE — SOLUTION IV IRRIG WATER 1000ML POUR BRL 2F7114